# Patient Record
Sex: MALE | Race: WHITE | Employment: UNEMPLOYED | URBAN - METROPOLITAN AREA
[De-identification: names, ages, dates, MRNs, and addresses within clinical notes are randomized per-mention and may not be internally consistent; named-entity substitution may affect disease eponyms.]

---

## 2017-01-12 ENCOUNTER — OP HISTORICAL/CONVERTED ENCOUNTER (OUTPATIENT)
Dept: OTHER | Age: 47
End: 2017-01-12

## 2017-03-21 ENCOUNTER — IP HISTORICAL/CONVERTED ENCOUNTER (OUTPATIENT)
Dept: OTHER | Age: 47
End: 2017-03-21

## 2017-05-19 ENCOUNTER — ED HISTORICAL/CONVERTED ENCOUNTER (OUTPATIENT)
Dept: OTHER | Age: 47
End: 2017-05-19

## 2017-06-27 ENCOUNTER — ED HISTORICAL/CONVERTED ENCOUNTER (OUTPATIENT)
Dept: OTHER | Age: 47
End: 2017-06-27

## 2017-10-19 ENCOUNTER — IP HISTORICAL/CONVERTED ENCOUNTER (OUTPATIENT)
Dept: OTHER | Age: 47
End: 2017-10-19

## 2017-12-15 ENCOUNTER — ED HISTORICAL/CONVERTED ENCOUNTER (OUTPATIENT)
Dept: OTHER | Age: 47
End: 2017-12-15

## 2018-03-08 ENCOUNTER — ED HISTORICAL/CONVERTED ENCOUNTER (OUTPATIENT)
Dept: OTHER | Age: 48
End: 2018-03-08

## 2022-06-30 ENCOUNTER — HOSPITAL ENCOUNTER (EMERGENCY)
Age: 52
Discharge: ANOTHER HOSPITAL | End: 2022-06-30
Attending: EMERGENCY MEDICINE
Payer: COMMERCIAL

## 2022-06-30 ENCOUNTER — APPOINTMENT (OUTPATIENT)
Dept: GENERAL RADIOLOGY | Age: 52
End: 2022-06-30
Attending: EMERGENCY MEDICINE
Payer: COMMERCIAL

## 2022-06-30 ENCOUNTER — HOSPITAL ENCOUNTER (INPATIENT)
Age: 52
LOS: 1 days | Discharge: HOME | DRG: 305 | End: 2022-07-03
Attending: EMERGENCY MEDICINE | Admitting: HOSPITALIST
Payer: MEDICAID

## 2022-06-30 VITALS
SYSTOLIC BLOOD PRESSURE: 136 MMHG | OXYGEN SATURATION: 99 % | RESPIRATION RATE: 18 BRPM | DIASTOLIC BLOOD PRESSURE: 112 MMHG | HEART RATE: 66 BPM | TEMPERATURE: 98.9 F

## 2022-06-30 DIAGNOSIS — I10 HYPERTENSION, UNSPECIFIED TYPE: Primary | ICD-10-CM

## 2022-06-30 DIAGNOSIS — R07.9 ACUTE CHEST PAIN: Primary | ICD-10-CM

## 2022-06-30 DIAGNOSIS — I10 PRIMARY HYPERTENSION: ICD-10-CM

## 2022-06-30 PROBLEM — I16.1 HYPERTENSIVE EMERGENCY: Status: ACTIVE | Noted: 2022-06-30

## 2022-06-30 LAB
ALBUMIN SERPL-MCNC: 3.9 G/DL (ref 3.5–5)
ALBUMIN/GLOB SERPL: 1.1 {RATIO} (ref 1.1–2.2)
ALP SERPL-CCNC: 69 U/L (ref 45–117)
ALT SERPL-CCNC: 22 U/L (ref 12–78)
AMPHET UR QL SCN: NEGATIVE
ANION GAP SERPL CALC-SCNC: 6 MMOL/L (ref 5–15)
APPEARANCE UR: CLEAR
AST SERPL W P-5'-P-CCNC: 15 U/L (ref 15–37)
BACTERIA URNS QL MICRO: NEGATIVE /HPF
BARBITURATES UR QL SCN: NEGATIVE
BASOPHILS # BLD: 0 K/UL (ref 0–0.2)
BASOPHILS NFR BLD: 0 % (ref 0–2.5)
BENZODIAZ UR QL: NEGATIVE
BILIRUB SERPL-MCNC: 0.7 MG/DL (ref 0.2–1)
BILIRUB UR QL: NEGATIVE
BNP SERPL-MCNC: 49 PG/ML
BUN SERPL-MCNC: 9 MG/DL (ref 6–20)
BUN/CREAT SERPL: 8 (ref 12–20)
CA-I BLD-MCNC: 9.1 MG/DL (ref 8.5–10.1)
CANNABINOIDS UR QL SCN: NEGATIVE
CHLORIDE SERPL-SCNC: 104 MMOL/L (ref 97–108)
CO2 SERPL-SCNC: 30 MMOL/L (ref 21–32)
COCAINE UR QL SCN: NEGATIVE
COLOR UR: ABNORMAL
CREAT SERPL-MCNC: 1.15 MG/DL (ref 0.7–1.3)
DRUG SCRN COMMENT,DRGCM: NORMAL
ECSTASY, ECST: NEGATIVE
EOSINOPHIL # BLD: 0.1 K/UL (ref 0–0.7)
EOSINOPHIL NFR BLD: 1 % (ref 0.9–2.9)
ERYTHROCYTE [DISTWIDTH] IN BLOOD BY AUTOMATED COUNT: 13.5 % (ref 11.5–14.5)
GLOBULIN SER CALC-MCNC: 3.7 G/DL (ref 2–4)
GLUCOSE SERPL-MCNC: 86 MG/DL (ref 65–100)
GLUCOSE UR STRIP.AUTO-MCNC: NEGATIVE MG/DL
HCT VFR BLD AUTO: 43.5 % (ref 41–53)
HGB BLD-MCNC: 13.8 G/DL (ref 13.5–17.5)
HGB UR QL STRIP: ABNORMAL
INR PPP: 1 (ref 0.9–1.1)
KETONES UR QL STRIP.AUTO: NEGATIVE MG/DL
LEUKOCYTE ESTERASE UR QL STRIP.AUTO: NEGATIVE
LYMPHOCYTES # BLD: 2.2 K/UL (ref 1–4.8)
LYMPHOCYTES NFR BLD: 35 % (ref 20.5–51.1)
MAGNESIUM SERPL-MCNC: 2.2 MG/DL (ref 1.6–2.4)
MCH RBC QN AUTO: 26.3 PG (ref 31–34)
MCHC RBC AUTO-ENTMCNC: 31.7 G/DL (ref 31–36)
MCV RBC AUTO: 82.8 FL (ref 80–100)
METHADONE UR QL: NEGATIVE
MONOCYTES # BLD: 0.7 K/UL (ref 0.2–2.4)
MONOCYTES NFR BLD: 10 % (ref 1.7–9.3)
NEUTS SEG # BLD: 3.4 K/UL (ref 1.8–7.7)
NEUTS SEG NFR BLD: 54 % (ref 42–75)
NITRITE UR QL STRIP.AUTO: NEGATIVE
NRBC # BLD: 0.01 K/UL
NRBC BLD-RTO: 0.1 PER 100 WBC
OPIATES UR QL: NEGATIVE
PCP UR QL: NEGATIVE
PH UR STRIP: 6.5 [PH] (ref 5–8)
PLATELET # BLD AUTO: 224 K/UL (ref 150–400)
PMV BLD AUTO: 8 FL (ref 6.5–11.5)
POTASSIUM SERPL-SCNC: 3.8 MMOL/L (ref 3.5–5.1)
PROT SERPL-MCNC: 7.6 G/DL (ref 6.4–8.2)
PROT UR STRIP-MCNC: NEGATIVE MG/DL
PROTHROMBIN TIME: 13.2 SEC (ref 11.9–14.6)
RBC # BLD AUTO: 5.25 M/UL (ref 4.5–5.9)
RBC #/AREA URNS HPF: NORMAL /HPF (ref 0–3)
SODIUM SERPL-SCNC: 140 MMOL/L (ref 136–145)
SP GR UR REFRACTOMETRY: 1.02 (ref 1–1.03)
TROPONIN-HIGH SENSITIVITY: 14 NG/L (ref 0–76)
TROPONIN-HIGH SENSITIVITY: 15 NG/L (ref 0–76)
UROBILINOGEN UR QL STRIP.AUTO: 0.2 EU/DL (ref 0.2–1)
WBC # BLD AUTO: 6.4 K/UL (ref 4.4–11.3)
WBC URNS QL MICRO: NORMAL /HPF (ref 0–5)

## 2022-06-30 PROCEDURE — 74011250637 HC RX REV CODE- 250/637: Performed by: EMERGENCY MEDICINE

## 2022-06-30 PROCEDURE — 84484 ASSAY OF TROPONIN QUANT: CPT

## 2022-06-30 PROCEDURE — 99285 EMERGENCY DEPT VISIT HI MDM: CPT

## 2022-06-30 PROCEDURE — 93005 ELECTROCARDIOGRAM TRACING: CPT

## 2022-06-30 PROCEDURE — 81001 URINALYSIS AUTO W/SCOPE: CPT

## 2022-06-30 PROCEDURE — 80053 COMPREHEN METABOLIC PANEL: CPT

## 2022-06-30 PROCEDURE — 96374 THER/PROPH/DIAG INJ IV PUSH: CPT

## 2022-06-30 PROCEDURE — 36415 COLL VENOUS BLD VENIPUNCTURE: CPT

## 2022-06-30 PROCEDURE — 80307 DRUG TEST PRSMV CHEM ANLYZR: CPT

## 2022-06-30 PROCEDURE — 96375 TX/PRO/DX INJ NEW DRUG ADDON: CPT

## 2022-06-30 PROCEDURE — 83880 ASSAY OF NATRIURETIC PEPTIDE: CPT

## 2022-06-30 PROCEDURE — 71045 X-RAY EXAM CHEST 1 VIEW: CPT

## 2022-06-30 PROCEDURE — 85025 COMPLETE CBC W/AUTO DIFF WBC: CPT

## 2022-06-30 PROCEDURE — 74011250636 HC RX REV CODE- 250/636: Performed by: EMERGENCY MEDICINE

## 2022-06-30 PROCEDURE — C9113 INJ PANTOPRAZOLE SODIUM, VIA: HCPCS | Performed by: EMERGENCY MEDICINE

## 2022-06-30 PROCEDURE — 74011000250 HC RX REV CODE- 250: Performed by: EMERGENCY MEDICINE

## 2022-06-30 PROCEDURE — 96372 THER/PROPH/DIAG INJ SC/IM: CPT

## 2022-06-30 PROCEDURE — 83735 ASSAY OF MAGNESIUM: CPT

## 2022-06-30 PROCEDURE — 85610 PROTHROMBIN TIME: CPT

## 2022-06-30 RX ORDER — MAG HYDROX/ALUMINUM HYD/SIMETH 200-200-20
30 SUSPENSION, ORAL (FINAL DOSE FORM) ORAL
Status: COMPLETED | OUTPATIENT
Start: 2022-06-30 | End: 2022-06-30

## 2022-06-30 RX ORDER — GUAIFENESIN 100 MG/5ML
324 LIQUID (ML) ORAL
Status: COMPLETED | OUTPATIENT
Start: 2022-06-30 | End: 2022-06-30

## 2022-06-30 RX ORDER — NIFEDIPINE 30 MG/1
60 TABLET, EXTENDED RELEASE ORAL
Status: COMPLETED | OUTPATIENT
Start: 2022-06-30 | End: 2022-06-30

## 2022-06-30 RX ORDER — MORPHINE SULFATE 4 MG/ML
8 INJECTION INTRAVENOUS ONCE
Status: COMPLETED | OUTPATIENT
Start: 2022-06-30 | End: 2022-06-30

## 2022-06-30 RX ORDER — NITROGLYCERIN 0.4 MG/1
0.4 TABLET SUBLINGUAL
Status: COMPLETED | OUTPATIENT
Start: 2022-06-30 | End: 2022-06-30

## 2022-06-30 RX ORDER — ACETAMINOPHEN 500 MG
1000 TABLET ORAL ONCE
Status: COMPLETED | OUTPATIENT
Start: 2022-06-30 | End: 2022-06-30

## 2022-06-30 RX ORDER — NITROGLYCERIN 20 MG/100ML
0-20 INJECTION INTRAVENOUS
Status: DISCONTINUED | OUTPATIENT
Start: 2022-06-30 | End: 2022-06-30 | Stop reason: HOSPADM

## 2022-06-30 RX ADMIN — NITROGLYCERIN 5 MCG/MIN: 20 INJECTION INTRAVENOUS at 18:05

## 2022-06-30 RX ADMIN — MORPHINE SULFATE 8 MG: 4 INJECTION, SOLUTION INTRAMUSCULAR; INTRAVENOUS at 14:25

## 2022-06-30 RX ADMIN — ALUMINA, MAGNESIA, AND SIMETHICONE ORAL SUSPENSION REGULAR STRENGTH 30 ML: 1200; 1200; 120 SUSPENSION ORAL at 15:54

## 2022-06-30 RX ADMIN — NITROGLYCERIN 0.4 MG: 0.4 TABLET, ORALLY DISINTEGRATING SUBLINGUAL at 12:43

## 2022-06-30 RX ADMIN — ACETAMINOPHEN 1000 MG: 500 TABLET ORAL at 22:15

## 2022-06-30 RX ADMIN — NIFEDIPINE 60 MG: 30 TABLET, EXTENDED RELEASE ORAL at 15:53

## 2022-06-30 RX ADMIN — ASPIRIN 81 MG 324 MG: 81 TABLET ORAL at 12:44

## 2022-06-30 RX ADMIN — SODIUM CHLORIDE 40 MG: 9 INJECTION, SOLUTION INTRAMUSCULAR; INTRAVENOUS; SUBCUTANEOUS at 15:55

## 2022-06-30 RX ADMIN — NITROGLYCERIN 1 INCH: 20 OINTMENT TOPICAL at 22:15

## 2022-06-30 RX ADMIN — NITROGLYCERIN 0.4 MG: 0.4 TABLET, ORALLY DISINTEGRATING SUBLINGUAL at 14:24

## 2022-06-30 NOTE — ED PROVIDER NOTES
EMERGENCY DEPARTMENT HISTORY AND PHYSICAL EXAM      Date: 6/30/2022  Patient Name: Cr Graham      History of Presenting Illness     Chief Complaint   Patient presents with    Chest Pain       History Provided By: Patient    HPI: Cr Graham, 50 y.o. male with a past medical history significant hypertension, myocardial infarction and CAD, defibrillator, CVA presents to the ED with cc of chest pain sharp nonradiating patient received 3 sublingual nitro with no relief of pain patient had associated shortness of breath no [facility], patient describes the pain intensity as 7/10 but it was bad as 10/10 pain started 0600 hours, patient states his defibrillator did not fire off today    There are no other complaints, changes, or physical findings at this time. PCP: Unknown, Provider, PA    Current Outpatient Medications   Medication Sig Dispense Refill    amLODIPine (NORVASC) 5 mg tablet Take 1 Tab by mouth daily. 30 Tab 0    aspirin 81 mg chewable tablet Take 81 mg by mouth daily.  cloNIDine HCl (CATAPRES) 0.2 mg tablet Take  by mouth two (2) times a day.  simvastatin (ZOCOR) 20 mg tablet Take  by mouth nightly.  cyanocobalamin (VITAMIN B12) 100 mcg tablet Take 50 mcg by mouth daily.  levETIRAcetam (KEPPRA) 750 mg tablet Take 750 mg by mouth two (2) times a day.  omeprazole (PRILOSEC) 40 mg capsule Take 40 mg by mouth daily.  tamsulosin (FLOMAX) 0.4 mg capsule Take 0.4 mg by mouth daily.  nitroglycerin (NITROSTAT) 0.4 mg SL tablet by SubLINGual route every five (5) minutes as needed for Chest Pain. Past History   Past Medical History:  Past Medical History:   Diagnosis Date    [facility] [address]     Dyslipidemia     Hypertension     MI (myocardial infarction) [facility] [address]     Seizures ([facility] [address]        Past Surgical History:  No past surgical history on file. Family History:  No family history on file.     Social History:  Social History     Tobacco Use    Smoking status: Unknown If Ever Smoked    Smokeless tobacco: Not on file   Substance Use Topics    Alcohol use: No    Drug use: Not on file       Allergies: Allergies   Allergen Reactions    Ciprofloxacin Unknown (comments)    Clindamycin Unknown (comments)    Doxycycline Unknown (comments)    Lisinopril Unknown (comments)    Sulfa (Sulfonamide Antibiotics) Unknown (comments)     Review of Systems   Review of Systems   Constitutional: Negative for chills and fever. HENT: Negative for rhinorrhea and sore throat. Eyes: Negative for pain and visual disturbance. Respiratory: Positive for shortness of breath. Negative for cough. Cardiovascular: Positive for chest pain. Negative for leg swelling. Gastrointestinal: Negative for abdominal pain and vomiting. Endocrine: Negative for polydipsia and polyuria. Genitourinary: Negative for dysuria and hematuria. Musculoskeletal: Negative for back pain and neck pain. Skin: Negative for color change and pallor. Neurological: Negative for weakness and headaches. Psychiatric/Behavioral: Negative for agitation and suicidal ideas. Physical Exam   Physical Exam  Vitals and nursing note reviewed. Constitutional:       General: He is not in acute distress. Appearance: He is not ill-appearing, toxic-appearing or diaphoretic. HENT:      Head: Normocephalic and atraumatic. Right Ear: Tympanic membrane normal.      Left Ear: Tympanic membrane normal.      Nose: Nose normal. No congestion. Mouth/Throat:      Mouth: Mucous membranes are moist.      Pharynx: Oropharynx is clear. Eyes:      Extraocular Movements: Extraocular movements intact. Conjunctiva/sclera: Conjunctivae normal.      Pupils: Pupils are equal, round, and reactive to light. Cardiovascular:      Rate and Rhythm: Normal rate and regular rhythm. Pulses: Normal pulses. Heart sounds: Normal heart sounds.    Pulmonary:      Effort: Pulmonary effort is normal.      Breath sounds: Normal breath sounds. Abdominal:      General: Bowel sounds are normal.      Palpations: Abdomen is soft. Tenderness: There is no abdominal tenderness. Musculoskeletal:         General: No tenderness, deformity or signs of injury. Normal range of motion. Cervical back: Normal range of motion and neck supple. No rigidity or tenderness. Right lower leg: No tenderness. No edema. Left lower leg: No tenderness. No edema. Lymphadenopathy:      Cervical: No cervical adenopathy. Skin:     General: Skin is warm and dry. Capillary Refill: Capillary refill takes less than 2 seconds. Findings: No rash. Neurological:      General: No focal deficit present. Mental Status: He is alert and oriented to person, place, and time. Cranial Nerves: No cranial nerve deficit. Sensory: No sensory deficit. Psychiatric:         Mood and Affect: Mood normal.         Behavior: Behavior normal.         Lab and Diagnostic Study Results   Labs -   No results found for this or any previous visit (from the past 12 hour(s)). Radiologic Studies -   [unfilled]  CT Results  (Last 48 hours)    None        CXR Results  (Last 48 hours)    None          Medical Decision Making and ED Course   - I am the first and primary provider for this patient AND AM THE PRIMARY PROVIDER OF RECORD. I reviewed the vital signs, available nursing notes, past medical history, past surgical history, family history and social history. - Initial assessment performed. The patients presenting problems have been discussed, and the staff are in agreement with the care plan formulated and outlined with them. I have encouraged them to ask questions as they arise throughout their visit.     Differential Diagnosis & Medical Decision Making Provider Note:   Complaint of chest pain differential diagnosis of ACS, CHF, pneumonia  MDM     Vital Signs-Reviewed the patient's vital signs.  No data found. EKG interpretation: (Preliminary): Performed at [phone]. EKG Interpreted by me. Shows atrial paced rhythm rate 62 diffusely flattened T waves no acute ischemic changes    ED Course:   ED Course as of 06/30/22 1825   Thu Jun 30, 2022   1337 Patient's chest pain down to 5 [SB]   1516 Patient complaining of headache and burning sensation to central chest [SB]   1817 Pain diminished down to 3/10 after initiation of nitroglycerin infusion [SB]      ED Course User Index  [SB] Nilsa Crisostomo MD       HYPERTENSION COUNSELING: Education was provided to the patient today regarding their hypertension. Patient is made aware of their elevated blood pressure and is instructed to follow up this week with their Primary Care for a recheck. Patient is counseled regarding consequences of chronic, uncontrolled hypertension including kidney disease, heart disease, stroke or even death. Patient states their understanding and agrees to follow up this week. Additionally, during their visit, I discussed sodium restriction, maintaining ideal body weight and regular exercise program as physiologic means to achieve blood pressure control. The patient will strive towards this. Procedures and Critical Care     Performed by: Ronda Coughlin MD  Procedures      CRITICAL CARE NOTE :  12:28 PM  Amount of Critical Care Time: 42minutes    IMPENDING DETERIORATION -Cardiovascular  ASSOCIATED RISK FACTORS - Dysrhythmia and Myocardial infarction, CVA  MANAGEMENT- Bedside Assessment and Transfer  INTERPRETATION -  Blood Pressure  INTERVENTIONS - hemodynamic mngmt  CASE REVIEW -none  TREATMENT RESPONSE -Improved  PERFORMED BY - Self    NOTES   :  I have spent the above critical care time involved in lab review, consultations with specialist, family decision- making, bedside attention and documentation. This time excludes time spent in any separate billed procedures.   During this entire length of time I was immediately available to the patient . Maia Jung MD    Disposition   Disposition: Condition stable, improved and ongoing  Transferred to [facility] patient verbally agreed to transfer and understand the risks involved as outlined in the EMTALA form. DISCHARGE PLAN:  1. Current Discharge Medication List      CONTINUE these medications which have NOT CHANGED    Details   amLODIPine (NORVASC) 5 mg tablet Take 1 Tab by mouth daily. Qty: 30 Tab, Refills: 0      aspirin 81 mg chewable tablet Take 81 mg by mouth daily. cloNIDine HCl (CATAPRES) 0.2 mg tablet Take  by mouth two (2) times a day. simvastatin (ZOCOR) 20 mg tablet Take  by mouth nightly. cyanocobalamin (VITAMIN B12) 100 mcg tablet Take 50 mcg by mouth daily. levETIRAcetam (KEPPRA) 750 mg tablet Take 750 mg by mouth two (2) times a day. omeprazole (PRILOSEC) 40 mg capsule Take 40 mg by mouth daily. tamsulosin (FLOMAX) 0.4 mg capsule Take 0.4 mg by mouth daily. nitroglycerin (NITROSTAT) 0.4 mg SL tablet by SubLINGual route every five (5) minutes as needed for Chest Pain. 2.   Follow-up Information    None       3. Return to ED if worse   4. Current Discharge Medication List        Remove if not discharged    Diagnosis/Clinical Impression     Clinical Impression: No diagnosis found. Attestations:  Maia Jung MD    Please note that this dictation was completed with [employer], the computer voice recognition software. Quite often unanticipated grammatical, syntax, homophones, and other interpretive errors are inadvertently transcribed by the computer software. Please disregard these errors. Please excuse any errors that have escaped final proofreading. Thank you.

## 2022-06-30 NOTE — ED TRIAGE NOTES
Patient reports substernal chest pain/pressure that woke him out of his sleep at around 0630. Was given nitro x3 a [facility] with no relief.

## 2022-06-30 NOTE — ED NOTES
Pt nitro drip titrated up to 10mcg, Pt bp still over target range, Oncoming RN ED made aware.  Awaiting Transport to [facility] ED

## 2022-06-30 NOTE — Clinical Note
Patient Class[de-identified] OBSERVATION [104]   Type of Bed: Remote Telemetry [29]   Cardiac Monitoring Required?: Yes   Reason for Observation: chest pain   Admitting Diagnosis: Chest pain [086949]   Admitting Diagnosis: Hypertensive emergency [8111288]   Admitting Physician: [facility] [[id number]   Attending Physician: [facility] [[id number]]

## 2022-07-01 ENCOUNTER — APPOINTMENT (OUTPATIENT)
Dept: NON INVASIVE DIAGNOSTICS | Age: 52
DRG: 305 | End: 2022-07-01
Attending: INTERNAL MEDICINE
Payer: MEDICAID

## 2022-07-01 LAB
ATRIAL RATE: 62 BPM
ATRIAL RATE: 64 BPM
CALCULATED P AXIS, ECG09: 19 DEGREES
CALCULATED P AXIS, ECG09: 44 DEGREES
CALCULATED R AXIS, ECG10: -4 DEGREES
CALCULATED R AXIS, ECG10: 9 DEGREES
CALCULATED T AXIS, ECG11: -11 DEGREES
CALCULATED T AXIS, ECG11: 6 DEGREES
DIAGNOSIS, 93000: NORMAL
DIAGNOSIS, 93000: NORMAL
ECHO AO ASC DIAM: 3.9 CM
ECHO AO ASCENDING AORTA INDEX: 1.83 CM/M2
ECHO AO DESC DIAM: 2.1 CM
ECHO AO DESCENDING AORTA INDEX: 0.99 CM/M2
ECHO AO ROOT DIAM: 4.4 CM
ECHO AO ROOT INDEX: 2.07 CM/M2
ECHO AV AREA PEAK VELOCITY: 2.8 CM2
ECHO AV AREA/BSA PEAK VELOCITY: 1.3 CM2/M2
ECHO AV PEAK GRADIENT: 8 MMHG
ECHO AV PEAK VELOCITY: 1.4 M/S
ECHO AV VELOCITY RATIO: 0.79
ECHO EST RA PRESSURE: 3 MMHG
ECHO LA AREA 4C: 11.7 CM2
ECHO LA DIAMETER INDEX: 1.36 CM/M2
ECHO LA DIAMETER: 2.9 CM
ECHO LA MAJOR AXIS: 4.7 CM
ECHO LA TO AORTIC ROOT RATIO: 0.66
ECHO LV E' LATERAL VELOCITY: 8 CM/S
ECHO LV E' SEPTAL VELOCITY: 5 CM/S
ECHO LV EJECTION FRACTION BIPLANE: 63 % (ref 55–100)
ECHO LV FRACTIONAL SHORTENING: 35 % (ref 28–44)
ECHO LV INTERNAL DIMENSION DIASTOLE INDEX: 2.16 CM/M2
ECHO LV INTERNAL DIMENSION DIASTOLIC: 4.6 CM (ref 4.2–5.9)
ECHO LV INTERNAL DIMENSION SYSTOLIC INDEX: 1.41 CM/M2
ECHO LV INTERNAL DIMENSION SYSTOLIC: 3 CM
ECHO LV IVSD: 1.2 CM (ref 0.6–1)
ECHO LV MASS 2D: 192.9 G (ref 88–224)
ECHO LV MASS INDEX 2D: 90.6 G/M2 (ref 49–115)
ECHO LV POSTERIOR WALL DIASTOLIC: 1.1 CM (ref 0.6–1)
ECHO LV RELATIVE WALL THICKNESS RATIO: 0.48
ECHO LVOT AREA: 3.5 CM2
ECHO LVOT DIAM: 2.1 CM
ECHO LVOT PEAK GRADIENT: 5 MMHG
ECHO LVOT PEAK VELOCITY: 1.1 M/S
ECHO MV A VELOCITY: 0.91 M/S
ECHO MV E DECELERATION TIME (DT): 243 MS
ECHO MV E VELOCITY: 0.62 M/S
ECHO MV E/A RATIO: 0.68
ECHO MV E/E' LATERAL: 7.75
ECHO MV E/E' RATIO (AVERAGED): 10.08
ECHO MV E/E' SEPTAL: 12.4
ECHO MV MAX VELOCITY: 1 M/S
ECHO MV MEAN GRADIENT: 2 MMHG
ECHO MV MEAN VELOCITY: 0.7 M/S
ECHO MV PEAK GRADIENT: 4 MMHG
ECHO MV REGURGITANT PEAK GRADIENT: 8 MMHG
ECHO MV REGURGITANT PEAK VELOCITY: 1.4 M/S
ECHO MV VTI: 23.3 CM
ECHO PVEIN A DURATION: 120 MS
ECHO PVEIN A VELOCITY: 0.3 M/S
ECHO RIGHT VENTRICULAR SYSTOLIC PRESSURE (RVSP): 16 MMHG
ECHO RV TAPSE: 1.7 CM (ref 1.7–?)
ECHO TV REGURGITANT MAX VELOCITY: 1.78 M/S
ECHO TV REGURGITANT PEAK GRADIENT: 13 MMHG
MRSA DNA SPEC QL NAA+PROBE: NOT DETECTED
P-R INTERVAL, ECG05: 163 MS
P-R INTERVAL, ECG05: 236 MS
Q-T INTERVAL, ECG07: 393 MS
Q-T INTERVAL, ECG07: 396 MS
QRS DURATION, ECG06: 76 MS
QRS DURATION, ECG06: 82 MS
QTC CALCULATION (BEZET), ECG08: 399 MS
QTC CALCULATION (BEZET), ECG08: 408 MS
TROPONIN-HIGH SENSITIVITY: 7 NG/L (ref 0–76)
VENTRICULAR RATE, ECG03: 62 BPM
VENTRICULAR RATE, ECG03: 64 BPM

## 2022-07-01 PROCEDURE — G0378 HOSPITAL OBSERVATION PER HR: HCPCS

## 2022-07-01 PROCEDURE — 74011000250 HC RX REV CODE- 250: Performed by: INTERNAL MEDICINE

## 2022-07-01 PROCEDURE — 74011250637 HC RX REV CODE- 250/637: Performed by: INTERNAL MEDICINE

## 2022-07-01 PROCEDURE — 74011250637 HC RX REV CODE- 250/637: Performed by: HOSPITALIST

## 2022-07-01 PROCEDURE — 87641 MR-STAPH DNA AMP PROBE: CPT

## 2022-07-01 PROCEDURE — 93306 TTE W/DOPPLER COMPLETE: CPT

## 2022-07-01 PROCEDURE — 96376 TX/PRO/DX INJ SAME DRUG ADON: CPT

## 2022-07-01 PROCEDURE — 74011250636 HC RX REV CODE- 250/636: Performed by: INTERNAL MEDICINE

## 2022-07-01 RX ORDER — BISMUTH SUBSALICYLATE 262 MG
1 TABLET,CHEWABLE ORAL DAILY
COMMUNITY

## 2022-07-01 RX ORDER — LATANOPROST 50 UG/ML
1 SOLUTION/ DROPS OPHTHALMIC
COMMUNITY
Start: 2022-04-21 | End: 2022-07-19

## 2022-07-01 RX ORDER — TAMSULOSIN HYDROCHLORIDE 0.4 MG/1
0.4 CAPSULE ORAL DAILY
Status: DISCONTINUED | OUTPATIENT
Start: 2022-07-01 | End: 2022-07-03 | Stop reason: HOSPADM

## 2022-07-01 RX ORDER — NIFEDIPINE 90 MG/1
90 TABLET, EXTENDED RELEASE ORAL DAILY
COMMUNITY
End: 2022-07-03

## 2022-07-01 RX ORDER — CLOPIDOGREL BISULFATE 75 MG/1
75 TABLET ORAL DAILY
COMMUNITY

## 2022-07-01 RX ORDER — ALBUTEROL SULFATE 90 UG/1
2 AEROSOL, METERED RESPIRATORY (INHALATION)
COMMUNITY
Start: 2022-04-12 | End: 2022-10-08

## 2022-07-01 RX ORDER — BUSPIRONE HYDROCHLORIDE 30 MG/1
30 TABLET ORAL 2 TIMES DAILY
COMMUNITY

## 2022-07-01 RX ORDER — SODIUM CHLORIDE 0.9 % (FLUSH) 0.9 %
5-40 SYRINGE (ML) INJECTION EVERY 8 HOURS
Status: DISCONTINUED | OUTPATIENT
Start: 2022-07-01 | End: 2022-07-03 | Stop reason: HOSPADM

## 2022-07-01 RX ORDER — AMLODIPINE BESYLATE 5 MG/1
10 TABLET ORAL DAILY
Status: DISCONTINUED | OUTPATIENT
Start: 2022-07-01 | End: 2022-07-03 | Stop reason: HOSPADM

## 2022-07-01 RX ORDER — HYDROMORPHONE HYDROCHLORIDE 1 MG/ML
0.5 INJECTION, SOLUTION INTRAMUSCULAR; INTRAVENOUS; SUBCUTANEOUS
Status: ACTIVE | OUTPATIENT
Start: 2022-07-01 | End: 2022-07-03

## 2022-07-01 RX ORDER — CARVEDILOL 3.12 MG/1
6.25 TABLET ORAL 2 TIMES DAILY WITH MEALS
Status: DISCONTINUED | OUTPATIENT
Start: 2022-07-01 | End: 2022-07-03 | Stop reason: HOSPADM

## 2022-07-01 RX ORDER — ONDANSETRON 4 MG/1
4 TABLET, ORALLY DISINTEGRATING ORAL
Status: DISCONTINUED | OUTPATIENT
Start: 2022-07-01 | End: 2022-07-03 | Stop reason: HOSPADM

## 2022-07-01 RX ORDER — ATORVASTATIN CALCIUM 10 MG/1
10 TABLET, FILM COATED ORAL
Status: DISCONTINUED | OUTPATIENT
Start: 2022-07-01 | End: 2022-07-03 | Stop reason: HOSPADM

## 2022-07-01 RX ORDER — CLONIDINE HYDROCHLORIDE 0.1 MG/1
0.2 TABLET ORAL 2 TIMES DAILY
Status: DISCONTINUED | OUTPATIENT
Start: 2022-07-01 | End: 2022-07-03

## 2022-07-01 RX ORDER — FLUTICASONE PROPIONATE AND SALMETEROL 232; 14 UG/1; UG/1
1 POWDER, METERED RESPIRATORY (INHALATION) 2 TIMES DAILY
COMMUNITY

## 2022-07-01 RX ORDER — PANTOPRAZOLE SODIUM 40 MG/1
40 TABLET, DELAYED RELEASE ORAL
Status: DISCONTINUED | OUTPATIENT
Start: 2022-07-01 | End: 2022-07-03 | Stop reason: HOSPADM

## 2022-07-01 RX ORDER — SALINE NASAL SPRAY 1.5 OZ
1 SOLUTION NASAL 4 TIMES DAILY
COMMUNITY

## 2022-07-01 RX ORDER — AMLODIPINE BESYLATE 5 MG/1
5 TABLET ORAL DAILY
Status: DISCONTINUED | OUTPATIENT
Start: 2022-07-01 | End: 2022-07-01

## 2022-07-01 RX ORDER — GUAIFENESIN 100 MG/5ML
81 LIQUID (ML) ORAL DAILY
Status: DISCONTINUED | OUTPATIENT
Start: 2022-07-01 | End: 2022-07-03 | Stop reason: HOSPADM

## 2022-07-01 RX ORDER — CARVEDILOL 6.25 MG/1
6.25 TABLET ORAL 2 TIMES DAILY
COMMUNITY

## 2022-07-01 RX ORDER — ALBUTEROL SULFATE 0.83 MG/ML
2.5 SOLUTION RESPIRATORY (INHALATION)
COMMUNITY
Start: 2022-04-21 | End: 2022-07-19

## 2022-07-01 RX ORDER — ENOXAPARIN SODIUM 100 MG/ML
40 INJECTION SUBCUTANEOUS DAILY
Status: DISCONTINUED | OUTPATIENT
Start: 2022-07-01 | End: 2022-07-03 | Stop reason: HOSPADM

## 2022-07-01 RX ORDER — ACETAMINOPHEN 650 MG/1
650 SUPPOSITORY RECTAL
Status: DISCONTINUED | OUTPATIENT
Start: 2022-07-01 | End: 2022-07-03 | Stop reason: HOSPADM

## 2022-07-01 RX ORDER — ACETAMINOPHEN 325 MG/1
650 TABLET ORAL
Status: DISCONTINUED | OUTPATIENT
Start: 2022-07-01 | End: 2022-07-03 | Stop reason: HOSPADM

## 2022-07-01 RX ORDER — ATORVASTATIN CALCIUM 40 MG/1
40 TABLET, FILM COATED ORAL DAILY
COMMUNITY

## 2022-07-01 RX ORDER — POLYETHYLENE GLYCOL 3350 17 G/17G
17 POWDER, FOR SOLUTION ORAL DAILY PRN
Status: DISCONTINUED | OUTPATIENT
Start: 2022-07-01 | End: 2022-07-03 | Stop reason: HOSPADM

## 2022-07-01 RX ORDER — LOSARTAN POTASSIUM 50 MG/1
50 TABLET ORAL DAILY
Status: DISCONTINUED | OUTPATIENT
Start: 2022-07-01 | End: 2022-07-03

## 2022-07-01 RX ORDER — SODIUM CHLORIDE 0.9 % (FLUSH) 0.9 %
5-40 SYRINGE (ML) INJECTION AS NEEDED
Status: DISCONTINUED | OUTPATIENT
Start: 2022-07-01 | End: 2022-07-03 | Stop reason: HOSPADM

## 2022-07-01 RX ORDER — BUTALBITAL, ACETAMINOPHEN AND CAFFEINE 50; 325; 40 MG/1; MG/1; MG/1
1 TABLET ORAL
Status: DISCONTINUED | OUTPATIENT
Start: 2022-07-01 | End: 2022-07-03 | Stop reason: HOSPADM

## 2022-07-01 RX ORDER — ONDANSETRON 2 MG/ML
4 INJECTION INTRAMUSCULAR; INTRAVENOUS
Status: DISCONTINUED | OUTPATIENT
Start: 2022-07-01 | End: 2022-07-03 | Stop reason: HOSPADM

## 2022-07-01 RX ADMIN — ATORVASTATIN CALCIUM 10 MG: 10 TABLET, FILM COATED ORAL at 22:09

## 2022-07-01 RX ADMIN — BUTALBITAL, ACETAMINOPHEN, AND CAFFEINE 1 TABLET: 50; 325; 40 TABLET ORAL at 17:35

## 2022-07-01 RX ADMIN — CLONIDINE HYDROCHLORIDE 0.2 MG: 0.1 TABLET ORAL at 20:35

## 2022-07-01 RX ADMIN — ENOXAPARIN SODIUM 40 MG: 100 INJECTION SUBCUTANEOUS at 09:04

## 2022-07-01 RX ADMIN — CARVEDILOL 6.25 MG: 3.12 TABLET, FILM COATED ORAL at 16:55

## 2022-07-01 RX ADMIN — CLONIDINE HYDROCHLORIDE 0.2 MG: 0.1 TABLET ORAL at 08:52

## 2022-07-01 RX ADMIN — ONDANSETRON 4 MG: 2 INJECTION INTRAMUSCULAR; INTRAVENOUS at 01:03

## 2022-07-01 RX ADMIN — SODIUM CHLORIDE, PRESERVATIVE FREE 10 ML: 5 INJECTION INTRAVENOUS at 06:00

## 2022-07-01 RX ADMIN — ACETAMINOPHEN 650 MG: 325 TABLET ORAL at 04:20

## 2022-07-01 RX ADMIN — ATORVASTATIN CALCIUM 10 MG: 10 TABLET, FILM COATED ORAL at 01:04

## 2022-07-01 RX ADMIN — AMLODIPINE BESYLATE 10 MG: 5 TABLET ORAL at 08:52

## 2022-07-01 RX ADMIN — SODIUM CHLORIDE 10 MG/HR: 9 INJECTION, SOLUTION INTRAVENOUS at 13:51

## 2022-07-01 RX ADMIN — SODIUM CHLORIDE 7.5 MG/HR: 9 INJECTION, SOLUTION INTRAVENOUS at 16:56

## 2022-07-01 RX ADMIN — SODIUM CHLORIDE 2.5 MG/HR: 9 INJECTION, SOLUTION INTRAVENOUS at 04:17

## 2022-07-01 RX ADMIN — LOSARTAN POTASSIUM 50 MG: 50 TABLET, FILM COATED ORAL at 06:47

## 2022-07-01 RX ADMIN — ACETAMINOPHEN 650 MG: 325 TABLET ORAL at 13:50

## 2022-07-01 RX ADMIN — SODIUM CHLORIDE, PRESERVATIVE FREE 10 ML: 5 INJECTION INTRAVENOUS at 22:00

## 2022-07-01 NOTE — PROGRESS NOTES
Reason for Admission:  Chest pain, Hypertensive emergency, Atypical chest pain                     RUR Score:                     Plan for utilizing home health:          PCP: First and Last name:  Unknown, Provider, PA     Name of Practice:    Are you a current patient: Yes/No:    Approximate date of last visit:    Can you participate in a virtual visit with your PCP:                     Current Advanced Directive/Advance Care Plan: Full Code      Healthcare Decision Maker:   Click here to complete [address] including selection of the Healthcare Decision Maker Relationship (ie \"Primary\")                             Transition of Care Plan:                      Patient is from [facility]. Ashley Locke is the point of contact @ [phone]. Ashley Locke fax [phone]. Clinicals faxed via [facility].             LISA Talley

## 2022-07-01 NOTE — PROGRESS NOTES
Admission Medication Reconciliation:    Information obtained from:  Transfer papers [facility])    Comments/Recommendations: Reviewed PTA medications and patient's allergies. Removed amlodipine 5 mg  Removed aspirin 81 mg chew  Removed vitamin b12 100 mcg  Removed omeprazole 40 mg  Removed levetiracetam 750 mg  Removed simvastatin 20 mg  Removed tamsulosin 0.4 mg        Allergies: Alteplase, Ciprofloxacin, Clindamycin, Doxycycline, Lisinopril, Nsaids (non-steroidal anti-inflammatory drug), and Sulfa (sulfonamide antibiotics)    Significant PMH/Disease States:   Past Medical History:   Diagnosis Date    Bipolar 1 disorder ([facility] [address]     Dyslipidemia     Hypertension     MI (myocardial infarction) ([facility] [address]     Seizures ([facility] [address]      Chief Complaint for this Admission:    Chief Complaint   Patient presents with    Chest Pain    Transfer Of Care     Prior to Admission Medications:   Prior to Admission Medications   Prescriptions Last Dose Informant Patient Reported? Taking? NIFEdipine ER (PROCARDIA XL) 90 mg ER tablet 2022 Transfer Papers Yes Yes   Sig: Take 90 mg by mouth daily. albuterol (PROVENTIL HFA, VENTOLIN HFA, PROAIR HFA) 90 mcg/actuation inhaler 22 Transfer Papers Yes Yes   Sig: Take 2 Puffs by inhalation two (2) times daily as needed for Wheezing. albuterol (PROVENTIL VENTOLIN) 2.5 mg /3 mL (0.083 %) nebu  Transfer Papers Yes Yes   Si.5 mg by Nebulization route two (2) times daily as needed for Wheezing or Shortness of Breath. atorvastatin (LIPITOR) 40 mg tablet 22 Transfer Papers Yes Yes   Sig: Take 40 mg by mouth daily. busPIRone (BUSPAR) 30 mg tablet 22 Transfer Papers Yes Yes   Sig: Take 30 mg by mouth two (2) times a day. carvediloL (COREG) 6.25 mg tablet 22 Transfer Papers Yes Yes   Sig: Take 6.25 mg by mouth two (2) times a day.    cloNIDine HCl (CATAPRES) 0.2 mg tablet 2022 Transfer Papers Yes Yes   Sig: Take  by mouth two (2) times daily as needed. STOP DATE 2022   clopidogreL (PLAVIX) 75 mg tab 22 Transfer Papers Yes Yes   Sig: Take 75 mg by mouth daily. fluticasone propion-salmeteroL (AirDuo RespiClick) 339-34 mcg/actuation aepb  Transfer Papers Yes Yes   Sig: Take 1 Puff by inhalation two (2) times a day. lanolin alcohol-mineral oil-petrolatum (Hydrocerin, with petrolatum,) topical cream 22 Transfer Papers Yes Yes   Sig: Apply  to affected area as needed for Dry Skin. APPLY TWICE DAILY   latanoprost (XALATAN) 0.005 % ophthalmic solution 22 Transfer Papers Yes Yes   Sig: Administer 1 Drop to both eyes nightly. multivitamin (ONE A DAY) tablet 22 Transfer Papers Yes Yes   Sig: Take 1 Tablet by mouth daily. nitroglycerin (NITROSTAT) 0.4 mg SL tablet 22 Transfer Papers Yes Yes   Si.4 mg by SubLINGual route every five (5) minutes as needed for Chest Pain.   sodium chloride (Deep Sea Nasal) 0.65 % nasal squeeze bottle  Transfer Papers Yes Yes   Si [place] by Both Nostrils route four (4) times daily.       Facility-Administered Medications: None       Nori Mcneil

## 2022-07-01 NOTE — ED NOTES
TRANSFER - OUT REPORT:    Verbal report given to LIVE Osman (name) on Tomasa Bonilla  being transferred to ICU2 (unit) for routine progression of care       Report consisted of patients Situation, Background, Assessment and   Recommendations(SBAR). Information from the following report(s) SBAR, ED Summary, MAR, Recent Results and Cardiac Rhythm NSR was reviewed with the receiving nurse. Lines:   Peripheral IV 06/30/22 Distal;Left Antecubital (Active)       Peripheral IV 06/30/22 Left;Posterior Forearm (Active)        Opportunity for questions and clarification was provided.       Patient transported with:   Monitor  Registered Nurse

## 2022-07-01 NOTE — ED TRIAGE NOTES
Patient transferred from [facility] for chest pain, patient on nitro drip on arrival

## 2022-07-01 NOTE — ED PROVIDER NOTES
EMERGENCY DEPARTMENT HISTORY AND PHYSICAL EXAM      Date: 6/30/2022  Patient Name: Ricky Verduzco      History of Presenting Illness     Chief Complaint   Patient presents with    Chest Pain    Transfer Of Care       History Provided By: Patient    HPI: Ricky Verduzco, 50 y.o. male with a past medical history significant Hypertension, MI, CAD status post AICD, CVA presents to the ED with cc of retention and chest pain. Patient states despite taking his medications today his blood pressure is very high. He is having chest pain. Seen at the outside hospital and started on nitro drip. States his chest pain went from 10 out of 10 to 3 out of 10 now. He denies shortness of breath. He is otherwise been in his normal state of health. He states his defibrillator has shocked him earlier this month but nothing today. His cardiologist is at [facility]. There are no other complaints, changes, or physical findings at this time.     PCP: Unknown, Provider, PA    Current Facility-Administered Medications   Medication Dose Route Frequency Provider Last Rate Last Admin    aspirin chewable tablet 81 mg  81 mg Oral DAILY Anna Martin MD        cloNIDine HCL (CATAPRES) tablet 0.2 mg  0.2 mg Oral BID Anna Martin MD        levETIRAcetam (KEPPRA) tablet 750 mg  750 mg Oral BID Deric Baker MD        pantoprazole (PROTONIX) tablet 40 mg  40 mg Oral ACB Anna Martin MD        atorvastatin (LIPITOR) tablet 10 mg  10 mg Oral QHS Deric Baker MD   10 mg at 07/01/22 0104    tamsulosin (FLOMAX) capsule 0.4 mg  0.4 mg Oral DAILY Anna Martin MD        sodium chloride (NS) flush 5-40 mL  5-40 mL IntraVENous Q8H Anna Martin MD        sodium chloride (NS) flush 5-40 mL  5-40 mL IntraVENous PRN Deric Baker MD        acetaminophen (TYLENOL) tablet 650 mg  650 mg Oral Q6H PRN Deric Baker MD   650 mg at 07/01/22 2616    Or    acetaminophen (TYLENOL) suppository 650 mg  650 mg Rectal Q6H PRN MD Sindhu Harvey polyethylene glycol (MIRALAX) packet 17 g  17 g Oral DAILY PRN Magdalena Martin MD        ondansetron (ZOFRAN ODT) tablet 4 mg  4 mg Oral Q8H PRN Magdalena Martin MD        Or    [facility] injection 4 mg  4 mg IntraVENous Q6H PRN Arin Orta MD   4 mg at 07/01/22 0103    enoxaparin (LOVENOX) injection 40 mg  40 mg SubCUTAneous DAILY Magdalena Martin MD        amLODIPine (NORVASC) tablet 10 mg  10 mg Oral DAILY Arin Orta MD        niCARdipine (CARDENE) 25 mg in 0.9% sodium chloride 250 mL ([id number])  5-15 mg/hr IntraVENous TITRATE Arin Orta MD 25 mL/hr at 07/01/22 0417 2.5 mg/hr at 07/01/22 0417    losartan (COZAAR) tablet 50 mg  50 mg Oral DAILY Arin Orta MD   50 mg at 07/01/22 3555     Current Outpatient Medications   Medication Sig Dispense Refill    amLODIPine (NORVASC) 5 mg tablet Take 1 Tab by mouth daily. 30 Tab 0    aspirin 81 mg chewable tablet Take 81 mg by mouth daily.  cloNIDine HCl (CATAPRES) 0.2 mg tablet Take  by mouth two (2) times a day.  simvastatin (ZOCOR) 20 mg tablet Take  by mouth nightly.  cyanocobalamin (VITAMIN B12) 100 mcg tablet Take 50 mcg by mouth daily.  levETIRAcetam (KEPPRA) 750 mg tablet Take 750 mg by mouth two (2) times a day.  omeprazole (PRILOSEC) 40 mg capsule Take 40 mg by mouth daily.  tamsulosin (FLOMAX) 0.4 mg capsule Take 0.4 mg by mouth daily.  nitroglycerin (NITROSTAT) 0.4 mg SL tablet by SubLINGual route every five (5) minutes as needed for Chest Pain. Past History     Past Medical History:  Past Medical History:   Diagnosis Date    [facility] [address]     Dyslipidemia     Hypertension     [facility]     [facility]iz[facility]        Past Surgical History:  No past surgical history on file. Family History:  History reviewed. No pertinent family history.     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 HENT: Negative for rhinorrhea and sore throat. Eyes: Negative for visual disturbance. Respiratory: Negative for cough and shortness of breath. Cardiovascular: Positive for chest pain. Gastrointestinal: Negative for abdominal pain, diarrhea, nausea and vomiting. Genitourinary: Negative for dysuria. Musculoskeletal: Negative for arthralgias and myalgias. Skin: Negative for rash. Neurological: Negative for headaches. Psychiatric/Behavioral: Negative for confusion. All other systems reviewed and are negative. Physical Exam     Physical Exam  Vitals and nursing note reviewed. Constitutional:       General: He is not in acute distress. Appearance: Normal appearance. He is not toxic-appearing or diaphoretic. HENT:      Head: Normocephalic and atraumatic. Eyes:      Pupils: Pupils are equal, round, and reactive to light. Cardiovascular:      Rate and Rhythm: Normal rate and regular rhythm. Pulses: Normal pulses. Pulmonary:      Effort: Pulmonary effort is normal.   Musculoskeletal:         General: No swelling or deformity. Skin:     Coloration: Skin is not pale. Findings: No rash. Neurological:      General: No focal deficit present. Mental Status: He is alert and oriented to person, place, and time. Sensory: No sensory deficit. Motor: No weakness.    Psychiatric:         Mood and Affect: Mood normal.         Behavior: Behavior normal.         Lab and Diagnostic Study Results     Labs -     Recent Results (from the past 12 hour(s))   TROPONIN-HIGH SENSITIVITY    Collection Time: 06/30/22 11:17 PM   Result Value Ref Range    Troponin-High Sensitivity 7 0 - 76 ng/L       Radiologic Studies -   [unfilled]  CT Results  (Last 48 hours)    None        CXR Results  (Last 48 hours)               06/30/22 1312  XR CHEST PORT Final result    Impression:  The cardiomediastinal silhouette is appropriate for age, technique,   and lung expansion. Pulmonary vasculature is not congested. The lungs are   essentially clear. No effusion or pneumothorax is seen. Narrative:  1 view       Left subclavian pacemaker                 Medical Decision Making and ED Course   - I am the first and primary provider for this patient AND AM THE PRIMARY PROVIDER OF RECORD. - I reviewed the vital signs, available nursing notes, past medical history, past surgical history, family history and social history. - Initial assessment performed. The patients presenting problems have been discussed, and the staff are in agreement with the care plan formulated and outlined with them. I have encouraged them to ask questions as they arise throughout their visit. Vital Signs-Reviewed the patient's vital signs.     Patient Vitals for the past 12 hrs:   Temp Pulse Resp BP SpO2   07/01/22 0647 -- 77 12 (!) 125/94 94 %   07/01/22 0632 -- 84 14 139/82 95 %   07/01/22 0617 -- 82 13 126/83 95 %   07/01/22 0602 -- 77 16 128/80 94 %   07/01/22 0547 -- 76 15 127/80 94 %   07/01/22 0532 -- 76 13 131/77 94 %   07/01/22 0517 -- 80 19 (!) 168/138 92 %   07/01/22 0502 98.2 °F (36.8 °C) 81 16 127/81 91 %   07/01/22 0447 -- 74 19 131/82 93 %   07/01/22 0432 -- 83 19 138/88 95 %   07/01/22 0417 -- 79 21 (!) 240/191 96 %   07/01/22 0402 -- 81 16 (!) 240/191 97 %   07/01/22 0315 -- 81 16 -- 96 %   07/01/22 0215 -- 79 14 (!) 139/99 95 % 07/01/22 0030 -- 76 18 (!) 141/112 96 %   06/30/22 2315 -- 75 15 (!) 133/102 95 %   06/30/22 2215 -- 77 -- (!) 141/94 --   06/30/22 2207 -- 73 12 (!) 141/89 98 %   06/30/22 2137 -- 74 18 (!) 125/103 91 %         Records Reviewed: Nursing Notes    ED Course:       ED Course as of 07/01/22 0750   u Jun 30, 2022   2312 50 yp M presents for evaluation of chest pain. Was seen earlier today at [facility]. He has found to have significantly elevated blood pressures and was treated for hypertensive emergency with a nitro drip. On arrival here patient states his chest pain is significantly improved however he states that he has having headache and nausea. His blood pressure is significantly lower than when he first presented, initially 125/103. I stopped the nitro drip. Gave patient 1000 g of Tylenol and applied 1 inch of Nitropaste instead. Will trend troponin. Patient will require admission. [LW]      ED Course User Index  [LW] Marsha Palacios MD     EKG performed at 2139, read at 2149. Sinus rhythm with first-degree AV block. 64.  Normal MO, QRS, normal QTC. Consultations:       Consultations: -  Hospitalist Consultant: Dr. Adi Peterson: We have asked for emergent assistance with regard to this patient. We have discussed the patients HPI, ROS, PE and results this far. They will come and evaluate the patient for admission. Disposition     Disposition: DC- Adult Discharges: All of the diagnostic tests were reviewed and questions answered. Diagnosis, care plan and treatment options were discussed. The patient understands the instructions and will follow up as directed. The patients results have been reviewed with them. They have been counseled regarding their diagnosis. The patient verbally convey understanding and agreement of the signs, symptoms, diagnosis, treatment and prognosis and additionally agrees to follow up as recommended with their PCP in 24 - 48 hours.   They also agree with the care-plan and convey that all of their questions have been answered. I have also put together some discharge instructions for them that include: 1) educational information regarding their diagnosis, 2) how to care for their diagnosis at home, as well a 3) list of reasons why they would want to return to the ED prior to their follow-up appointment, should their condition change. Diagnosis     Clinical Impression:   1. Hypertension, unspecified type        Attestations:    eMlvin Lara MD    Please note that this dictation was completed with WinView, the [employer] ([employer]) voice recognition software. Quite often unanticipated grammatical, syntax, homophones, and other interpretive errors are inadvertently transcribed by the computer software. Please disregard these errors. Please excuse any errors that have escaped final proofreading. Thank you.

## 2022-07-01 NOTE — H&P
History and Physical    Patient: Med Cooney MRN: [id number]  SSN: [id number]    YOB: 1970  Age: 50 y.o. Sex: male      Subjective:      Med Cooney is a 50 y.o. male with PMH of CAD s/p CPI, ? CHF s/p defibrillator , hypertension and HLP . Presented to the ED with chief complaint of of chest pain started this morning. State pain started from lower abdomen, radiates to chest then neck. Burning and tight in nature. 10/10 at maximum. Not related to dyspnea, nausea or diaphoresis. Trial of SL nitro, but did not improve symptoms and gave him headache. In the ED, noted elevated BP and started on BP. However, however over-corrected and patient complain of headache. Stopped nitro ggt. Patient currently reports only chest tightness, 5/10. Past Medical History:   Diagnosis Date    [facility] ([facility] [address]     Dyslipidemia     Hypertension     MI (myocardial infarction) ([facility] [address]     Seizures ([facility] [address]      No past surgical history on file. History reviewed. No pertinent family history. Social History     Tobacco Use    Smoking status: Unknown If Ever Smoked    Smokeless tobacco: Not on file   Substance Use Topics    Alcohol use: No      Prior to Admission medications    Medication Sig Start Date End Date Taking? Authorizing Provider   amLODIPine (NORVASC) 5 mg tablet Take 1 Tab by mouth daily. 5/2/16   Moira Siddiqui MD   aspirin 81 mg chewable tablet Take 81 mg by mouth daily. Malorie Osborne MD   cloNIDine HCl (CATAPRES) 0.2 mg tablet Take  by mouth two (2) times a day. Malorie Osborne MD   simvastatin (ZOCOR) 20 mg tablet Take  by mouth nightly. Malorie Osborne MD   cyanocobalamin (VITAMIN B12) 100 mcg tablet Take 50 mcg by mouth daily. Malroie Osborne MD   levETIRAcetam (KEPPRA) 750 mg tablet Take 750 mg by mouth two (2) times a day. Malorie Osborne MD   omeprazole (PRILOSEC) 40 mg capsule Take 40 mg by mouth daily.     Malorie Osborne MD   tamsulosin (FLOMAX) 0.4 mg capsule Take 0.4 mg by mouth daily. Dylon, MD Malorie   nitroglycerin (NITROSTAT) 0.4 mg SL tablet by SubLINGual route every five (5) minutes as needed for Chest Pain. 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    Sulfa (Sulfonamide Antibiotics) Unknown (comments)       Review of Systems:   Constitutional: No fevers, No chills, No fatigue, No weakness  Eyes: No visual disturbance  Ears, Nose, Mouth, Throat, and Face: No nasal congestion, No sore throat  Respiratory: No cough, No sputum, No wheezing, No SOB  Cardiovascular: See HPI   Gastrointestinal: No nausea, No vomiting, No diarrhea, No constipation, No abdominal pain  Genitourinary: No frequency, No dysuria, No hematuria  Integument/Breast: No rash, No skin lesion(s), No dryness  Musculoskeletal: No arthralgias, No neck pain, No back pain  Neurological: No headaches, No dizziness, No confusion,  No seizures  Behavioral/Psychiatric: No anxiety, No depression      Objective:     Vitals:    06/30/22 2137 06/30/22 2207 06/30/22 2215 06/30/22 2315   BP: (!) 125/103 (!) 141/89 (!) 141/94 (!) 133/102   Pulse: 74 73 77 75   Resp: 18 12  15   SpO2: 91% 98%  95%   Weight: 91.2 kg (201 lb)      Height: 6' (1.829 m)           Physical Exam:   General: alert, cooperative, no distress  Eye: conjunctivae/corneas clear. PERRL, EOM's intact. Throat and Neck: normal and no erythema or exudates noted. No mass   Lung: clear to auscultation bilaterally  Heart: regular rate and rhythm, no murmur  Abdomen: soft, non-tender. Bowel sounds normal. No masses,  Extremities:  able to move all extremities normal, atraumatic  Skin: Normal.  Neurologic: AOx3. Motor function and sensation grossly intact.   Psychiatric: non focal    Recent Results (from the past 24 hour(s))   EKG, 12 LEAD, INITIAL    Collection Time: 06/30/22 12:28 PM   Result Value Ref Range    Ventricular Rate 62 BPM    Atrial Rate 62 BPM    P-R Interval 163 ms    QRS Duration 82 ms    Q-T Interval 393 ms    QTC Calculation (Bezet) 399 ms    Calculated P Axis 19 degrees    Calculated R Axis -4 degrees    Calculated T Axis 6 degrees    Diagnosis       Atrial-paced rhythm  Baseline wander in lead(s) I,III,aVR,aVL     CBC WITH AUTOMATED DIFF    Collection Time: 06/30/22 12:59 PM   Result Value Ref Range    WBC 6.4 4.4 - 11.3 K/uL    RBC 5.25 4.50 - 5.90 M/uL    HGB 13.8 13.5 - 17.5 g/dL    HCT 43.5 41 - 53 %    MCV 82.8 80 - 100 FL    MCH 26.3 (L) 31 - 34 PG    MCHC 31.7 31.0 - 36.0 g/dL    RDW 13.5 11.5 - 14.5 %    PLATELET 822 419 - 891 K/uL    MPV 8.0 6.5 - 11.5 FL    NRBC 0.1  WBC    ABSOLUTE NRBC 0.01 K/uL    NEUTROPHILS 54 42 - 75 %    LYMPHOCYTES 35 20.5 - 51.1 %    MONOCYTES 10 (H) 1.7 - 9.3 %    EOSINOPHILS 1 0.9 - 2.9 %    BASOPHILS 0 0.0 - 2.5 %    ABS. NEUTROPHILS 3.4 1.8 - 7.7 K/UL    ABS. LYMPHOCYTES 2.2 1.0 - 4.8 K/UL    ABS. MONOCYTES 0.7 0.2 - 2.4 K/UL    ABS. EOSINOPHILS 0.1 0.0 - 0.7 K/UL    ABS.  BASOPHILS 0.0 0.0 - 0.2 K/UL   PROTHROMBIN TIME + INR    Collection Time: 06/30/22 12:59 PM   Result Value Ref Range    Prothrombin time 13.2 11.9 - 14.6 sec    INR 1.0 0.9 - 1.1     METABOLIC PANEL, COMPREHENSIVE    Collection Time: 06/30/22 12:59 PM   Result Value Ref Range    Sodium 140 136 - 145 mmol/L    Potassium 3.8 3.5 - 5.1 mmol/L    Chloride 104 97 - 108 mmol/L    CO2 30 21 - 32 mmol/L    Anion gap 6 5 - 15 mmol/L    Glucose 86 65 - 100 mg/dL    BUN 9 6 - 20 mg/dL    Creatinine 1.15 0.70 - 1.30 mg/dL    BUN/Creatinine ratio 8 (L) 12 - 20      GFR est AA >60 >60 ml/min/1.73m2    GFR est non-AA >60 >60 ml/min/1.73m2    Calcium 9.1 8.5 - 10.1 mg/dL    Bilirubin, total 0.7 0.2 - 1.0 mg/dL    AST (SGOT) 15 15 - 37 U/L    ALT (SGPT) 22 12 - 78 U/L Alk. phosphatase 69 45 - 117 U/L    Protein, total 7.6 6.4 - 8.2 g/dL    Albumin 3.9 3.5 - 5.0 g/dL    Globulin 3.7 2.0 - 4.0 g/dL    A-G Ratio 1.1 1.1 - 2.2     NT-PRO BNP    Collection Time: 06/30/22 12:59 PM   Result Value Ref Range    NT pro-BNP 49 <125 pg/mL   TROPONIN-HIGH SENSITIVITY    Collection Time: 06/30/22 12:59 PM   Result Value Ref Range    Troponin-High Sensitivity 14 0 - 76 ng/L   MAGNESIUM    Collection Time: 06/30/22 12:59 PM   Result Value Ref Range    Magnesium 2.2 1.6 - 2.4 mg/dL   DRUG SCREEN, URINE    Collection Time: 06/30/22 12:59 PM   Result Value Ref Range    AMPHETAMINES Negative Negative      BARBITURATES Negative Negative      BENZODIAZEPINES Negative Negative      COCAINE Negative Negative      ECSTASY, MDMA Negative Negative      METHADONE Negative Negative      OPIATES Negative Negative      PCP(PHENCYCLIDINE) Negative Negative      THC (TH-CANNABINOL) Negative Negative      Drug screen comment PH 6.5    URINALYSIS W/ RFLX MICROSCOPIC    Collection Time: 06/30/22 12:59 PM   Result Value Ref Range    Color Yellow/Straw      Appearance Clear Clear      Specific gravity 1.020 1.003 - 1.030      pH (UA) 6.5 5.0 - 8.0      Protein Negative Negative mg/dL    Glucose Negative Negative mg/dL    Ketone Negative Negative mg/dL    Bilirubin Negative Negative      Blood Trace (A) Negative      Urobilinogen 0.2 0.2 - 1.0 EU/dL    Nitrites Negative Negative      Leukocyte Esterase Negative Negative     URINE MICROSCOPIC    Collection Time: 06/30/22 12:59 PM   Result Value Ref Range    WBC 0-4 0 - 5 /hpf    RBC 0-5 0 - 3 /hpf    Bacteria Negative Negative /hpf   TROPONIN-HIGH SENSITIVITY    Collection Time: 06/30/22  2:44 PM   Result Value Ref Range    Troponin-High Sensitivity 15 0 - 76 ng/L       XR Results (maximum last 3):   Results from Hospital Encounter encounter on 06/30/22    XR CHEST PORT    Narrative  1 view    Left subclavian pacemaker    Impression  The cardiomediastinal silhouette is appropriate for age, technique,  and lung expansion. Pulmonary vasculature is not congested. The lungs are  essentially clear. No effusion or pneumothorax is seen. Results from [place] encounter on 04/30/16    XR CHEST SNGL V    Narrative  Chest radiograph frontal view    INDICATION: Altered mental status    COMPARISON: 1/9/2016    Impression  :  Low lung volumes accentuate the cardiac outline and cause crowding of the  pulmonary vasculature. Given this caveat, there remains haziness of the  pulmonary vasculature as can be seen with mild pulmonary edema. CT Results (maximum last 3): Results from [place] encounter on 04/30/16    CT SPINE CERV WO CONT    Narrative  CERVICAL SPINE CT:    HISTORY:  Altered mental status    TECHNIQUE:  Contiguous axial images were performed of the cervical spine without  contrast. Sagittal and coronal reformatted images were performed. Initial  interpretation by [facility] radiology. COMPARISON:  None. FINDINGS:    No acute fracture or subluxation. Occipital condyles are intact. Dens is  intact. Normal alignment of facet joints without widening or subluxation. Prevertebral and paravertebral soft tissues are normal.    Multilevel mild degenerative changes throughout the cervical spine. Visualized lung apices are clear. Impression  :  No evidence of acute fracture. CT HEAD WO CONT    Narrative  CT HEAD WITHOUT CONTRAST:    HISTORY:  Altered mental status    TECHNIQUE:  Contiguous axial images of the brain were obtained displayed with  both soft tissue and bone window. Coronal and sagittal reformations were also  performed. Initial interpretation by [facility] radiology. COMPARISON: None. FINDINGS:    No abnormal intra or extra axial fluid collections, hydrocephalus, midline  shift, or mass effect. No CT evidence of acute ischemia. No basilar cistern or  sulcal effacement.     There is opacification of an atrophic right maxillary sinus. Mastoid air cells  appear clear. Impression  :  No acute intracranial process. MRI Results (maximum last 3): No results found for this or any previous visit. Nuclear Medicine Results (maximum last 3): No results found for this or any previous visit. US Results (maximum last 3): No results found for this or any previous visit. Assessment and plan:   # Chest pain  - Atypical. No elevated troponin or BNP. Possibly secondary to hypertension emergency  - However given significant cardiac history, will admit for observation  - ECHO  - Consult cardiology    # Hypertension emergency  - Hold nitro ggt  - Monitor overnight. Keep BP approx 160/100  - Restart home medications, increase amlodipine to 10.  - Adjust as needed. # History of seizure  - Continue home medications. # Full code by default, need further clarification    # Medication list reviewed on Epic and/or outside documentation. Not reviewed with patient.         Signed By: Michel Dangelo MD     July 1, 2022

## 2022-07-01 NOTE — CONSULTS
Consult    Patient: Cr Graham MRN: [id number]  SSN: [id number]    YOB: 1970  Age: 50 y.o. Sex: male       Subjective:      Date of  Admission: 6/30/2022     Admission type: Emergency    Cr Graham is a 50 y.o. male with a history of hypertension, permanent pacemaker placement in the setting of syncope, bradycardia who is an inmate of [place] [address]. He presented to the hospital with a multitude of complaints including headache, left leg pain, chest pain. Blood pressure has been significantly elevated. He has no chest pain when seen by me but reports severe headache. There is no history of orthopnea, paroxysmal nocturnal dyspnea, dizziness, lightheadedness or syncope. Primary Care Provider: Unknown, Provider, PA  Past Medical History:   Diagnosis Date    Bipolar 1 disorder ([facility] [address]     Dyslipidemia     Hypertension          [facility]       No past surgical history on file. History reviewed. No pertinent family history.    Social History     Tobacco Use    Smoking status: Unknown If Ever Smoked    Smokeless tobacco: Not on file   Substance Use Topics    Alcohol use: No      Current Facility-Administered Medications   Medication Dose Route Frequency    aspirin chewable tablet 81 mg  81 mg Oral DAILY    cloNIDine HCL (CATAPRES) tablet 0.2 mg  0.2 mg Oral BID    levETIRAcetam (KEPPRA) tablet 750 mg  750 mg Oral BID    pantoprazole (PROTONIX) tablet 40 mg  40 mg Oral ACB    atorvastatin (LIPITOR) tablet 10 mg  10 mg Oral QHS    tamsulosin (FLOMAX) capsule 0.4 mg  0.4 mg Oral DAILY    sodium chloride (NS) flush 5-40 mL  5-40 mL IntraVENous Q8H    sodium chloride (NS) flush 5-40 mL  5-40 mL IntraVENous PRN    acetaminophen (TYLENOL) tablet 650 mg  650 mg Oral Q6H PRN    Or    acetaminophen (TYLENOL) suppository 650 mg  650 mg Rectal Q6H PRN    polyethylene glycol (MIRALAX) packet 17 g  17 g Oral DAILY PRN    ondansetron (ZOFRAN ODT) tablet 4 mg  4 mg Oral Q8H PRN    Or    ondansetron (ZOFRAN) injection 4 mg  4 mg IntraVENous Q6H PRN    enoxaparin (LOVENOX) injection 40 mg  40 mg SubCUTAneous DAILY    amLODIPine (NORVASC) tablet 10 mg  10 mg Oral DAILY    niCARdipine (CARDENE) 25 mg in 0.9% sodium chloride 250 mL ([id number])  5-15 mg/hr IntraVENous TITRATE    losartan (COZAAR) tablet 50 mg  50 mg Oral DAILY     Current Outpatient Medications   Medication Sig    amLODIPine (NORVASC) 5 mg tablet Take 1 Tab by mouth daily.  aspirin 81 mg chewable tablet Take 81 mg by mouth daily.  cloNIDine HCl (CATAPRES) 0.2 mg tablet Take  by mouth two (2) times a day.  simvastatin (ZOCOR) 20 mg tablet Take  by mouth nightly.  cyanocobalamin (VITAMIN B12) 100 mcg tablet Take 50 mcg by mouth daily.  levETIRAcetam (KEPPRA) 750 mg tablet Take 750 mg by mouth two (2) times a day.  omeprazole (PRILOSEC) 40 mg capsule Take 40 mg by mouth daily.  tamsulosin (FLOMAX) 0.4 mg capsule Take 0.4 mg by mouth daily.  nitroglycerin (NITROSTAT) 0.4 mg SL tablet by SubLINGual route every five (5) minutes as needed for Chest Pain. 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        Subjective:     Visit Vitals  BP (!) 131/93   Pulse 84   Temp 98.2 °F (36.8 °C)   Resp 18   Ht 6' (1.829 m)   Wt 91.2 kg (201 lb)   SpO2 95%   BMI 27.26 kg/m²        Physical Exam:  Visit Vitals  BP (!) 131/93   Pulse 84   Temp 98.2 °F (36.8 °C)   Resp 18   Ht 6' (1.829 m)   Wt 91.2 kg (201 lb)   SpO2 95%   BMI 27.26 kg/m²     General Appearance: Well developed, well nourished,alert and oriented x 3, and individual in no acute distress. Ears/Nose/Mouth/Throat:   Hearing grossly normal.         Neck: Supple. Chest:   Lungs clear to auscultation bilaterally. Cardiovascular:  Regular rate and rhythm, S1, S2 normal, no murmur. Abdomen:   Soft, non-tender, bowel sounds are active. Extremities: No edema bilaterally. Skin: Warm and dry. Cardiographics:  Telemetry: normal sinus rhythm    Data Reviewed: BMP: No results found for: NA, K, CL, CO2, AGAP, GLU, BUN, CREA, GFRAA, GFRNA  CMP: No results found for: NA, K, CL, CO2, AGAP, GLU, BUN, CREA, GFRAA, GFRNA, CA, MG, PHOS, ALB, TBIL, TP, ALB, GLOB, AGRAT, ALT  CBC: No results found for: WBC, HGB, HGBEXT, HCT, HCTEXT, PLT, PLTEXT, HGBEXT, HCTEXT, PLTEXT  All Cardiac Markers in the last 24 hours: No results found for: CPK, CK, CKMMB, CKMB, RCK3, CKMBT, CKNDX, CKND1, RALPH, TROPT, TROIQ, RAUDEL, TROPT, TNIPOC, BNP, BNPP  Recent Glucose Results: No results found for: GLU  ABG: No results found for: PH, PHI, PCO2, PCO2I, PO2, PO2I, HCO3, HCO3I, FIO2, FIO2I  COAGS: No results found for: APTT, PTP, INR, INREXT, INREXT  Liver Panel: No results found for: ALB, CBIL, TBIL, TP, GLOB, AGRAT, ASTPOC, ALTPOC, ALT, AP     Assessment:   Atypical chest pain  Hypertensive urgency  Status post permanent pacemaker     Plan:   53-year-old male with a past medical history as above who is seen for chest pain  -Patient with atypical chest pain. EKG shows no ischemic changes. Troponins have been negative. Echocardiogram shows preserved LV systolic function, no wall motion abnormalities. He has never had ischemia work-up before. He does have risk factors for coronary disease however I think his chest pain is probably related to uncontrolled blood pressures.   It would not be unreasonable to consider stress testing, possibly as an outpatient given the upcoming long weekend.  -He is currently on nicardipine infusion which should be weaned off as tolerated. Patient reports not taking multiple medications that the med reconciliation is showing. He says he takes Coreg which I have added to his orders. He says he takes clonidine only as needed. He has been started on 0.2 mg p.o. twice daily.   Will need to get an updated list from [facility]  -We will aim to manage his blood pressure, optimize his antihypertensive medications and then plan for stress testing potentially as an outpatient    Thank you for allowing us to participate in the care of your patient

## 2022-07-01 NOTE — PROGRESS NOTES
Hospitalist Progress Note    Subjective:   Daily Progress Note: 7/1/2022 10:53 AM    Hospital Course:  Porsha Christianson is a 68-year-old male with PMHx of CAD s/p PCI, CHF s/p defibrillator , hypertension and HLP who presented to the ED with chief complaint of of chest pain started this morning. State pain started from lower abdomen, radiates to chest then neck. Burning and tight in nature. 10/10 at maximum. Not related to dyspnea, nausea or diaphoresis. Trial of SL nitro, but did not improve symptoms and gave him headache. In the ED, noted elevated BP and started on nicardipine gtt. Patient currently reports only chest tightness, 5/10. CBC and CMP unremarkable. Troponin negative x3. EKG showing no signs of ischemia. Chest x-ray negative for acute pulmonary infiltrates. Cardiology consult. Patient will require pacemaker interrogation. Subjective:    Patient seen and examined at bedside. Reports some improvement in chest tightness.      Current Facility-Administered Medications   Medication Dose Route Frequency    aspirin chewable tablet 81 mg  81 mg Oral DAILY    cloNIDine HCL (CATAPRES) tablet 0.2 mg  0.2 mg Oral BID    levETIRAcetam (KEPPRA) tablet 750 mg  750 mg Oral BID    pantoprazole (PROTONIX) tablet 40 mg  40 mg Oral ACB    atorvastatin (LIPITOR) tablet 10 mg  10 mg Oral QHS    tamsulosin (FLOMAX) capsule 0.4 mg  0.4 mg Oral DAILY    sodium chloride (NS) flush 5-40 mL  5-40 mL IntraVENous Q8H    sodium chloride (NS) flush 5-40 mL  5-40 mL IntraVENous PRN    acetaminophen (TYLENOL) tablet 650 mg  650 mg Oral Q6H PRN    Or    acetaminophen (TYLENOL) suppository 650 mg  650 mg Rectal Q6H PRN    polyethylene glycol (MIRALAX) packet 17 g  17 g Oral DAILY PRN    ondansetron (ZOFRAN ODT) tablet 4 mg  4 mg Oral Q8H PRN    Or    ondansetron (ZOFRAN) injection 4 mg  4 mg IntraVENous Q6H PRN    enoxaparin (LOVENOX) injection 40 mg  40 mg SubCUTAneous DAILY    amLODIPine (NORVASC) tablet 10 mg  10 mg Oral DAILY    niCARdipine (CARDENE) 25 mg in 0.9% sodium chloride 250 mL ([id number])  5-15 mg/hr IntraVENous TITRATE    losartan (COZAAR) tablet 50 mg  50 mg Oral DAILY     Current Outpatient Medications   Medication Sig    amLODIPine (NORVASC) 5 mg tablet Take 1 Tab by mouth daily.  aspirin 81 mg chewable tablet Take 81 mg by mouth daily.  cloNIDine HCl (CATAPRES) 0.2 mg tablet Take  by mouth two (2) times a day.  simvastatin (ZOCOR) 20 mg tablet Take  by mouth nightly.  cyanocobalamin (VITAMIN B12) 100 mcg tablet Take 50 mcg by mouth daily.  levETIRAcetam (KEPPRA) 750 mg tablet Take 750 mg by mouth two (2) times a day.  omeprazole (PRILOSEC) 40 mg capsule Take 40 mg by mouth daily.  tamsulosin (FLOMAX) 0.4 mg capsule Take 0.4 mg by mouth daily.  nitroglycerin (NITROSTAT) 0.4 mg SL tablet by SubLINGual route every five (5) minutes as needed for Chest Pain. Review of Systems  Constitutional: No fevers, No chills, No sweats, No fatigue, No Weakness  Eyes: No redness  Ears, nose, mouth, throat, and face: No nasal congestion, No sore throat, No voice change  Respiratory: No Shortness of Breath, No cough, No wheezing  Cardiovascular: + chest pain, No palpitations, No extremity edema  Gastrointestinal: No nausea, No vomiting, No diarrhea, No abdominal pain  Genitourinary: No frequency, No dysuria, No hematuria  Integument/breast: No skin lesion(s)   Neurological: No Confusion, No headaches, No dizziness      Objective:     Visit Vitals  BP (!) 131/93   Pulse 84   Temp 98.2 °F (36.8 °C)   Resp 18   Ht 6' (1.829 m)   Wt 91.2 kg (201 lb)   SpO2 95%   BMI 27.26 kg/m²      O2 Device: None (Room air)    Temp (24hrs), Av.6 °F (37 °C), Min:98.2 °F (36.8 °C), Max:98.9 °F (37.2 °C)      No intake/output data recorded. No intake/output data recorded. PHYSICAL EXAM:  Constitutional: No acute distress  Skin: Extremities and face reveal no rashes. HEENT: Sclerae anicteric.  Extra-occular muscles are intact. No oral ulcers. The neck is supple and no masses. Cardiovascular: Regular rate and rhythm. +S1/S2. No murmur or gallop. No JVD. Respiratory:  Clear breath sounds bilaterally with no wheezes, rales, or rhonchi. GI: Abdomen nondistended, soft, and nontender. Normal active bowel sounds. Rectal: Deferred   Musculoskeletal: No pitting edema of the lower legs. Able to move all ext  Neurological:  Patient is alert and oriented x3. Cranial nerves II-XII grossly intact  Psychiatric: Mood appears appropriate       Data Review    Recent Results (from the past 24 hour(s))   EKG, 12 LEAD, INITIAL    Collection Time: 06/30/22 12:28 PM   Result Value Ref Range    Ventricular Rate 62 BPM    Atrial Rate 62 BPM    P-R Interval 163 ms    QRS Duration 82 ms    Q-T Interval 393 ms    QTC Calculation (Bezet) 399 ms    Calculated P Axis 19 degrees    Calculated R Axis -4 degrees    Calculated T Axis 6 degrees    Diagnosis       Atrial-paced rhythm  Baseline wander in lead(s) I,III,aVR,aVL    Confirmed by Aldo Doan M.D., Bonnie Reed ([id number]) on 7/1/2022 10:32:51 AM     CBC WITH AUTOMATED DIFF    Collection Time: 06/30/22 12:59 PM   Result Value Ref Range    WBC 6.4 4.4 - 11.3 K/uL    RBC 5.25 4.50 - 5.90 M/uL    HGB 13.8 13.5 - 17.5 g/dL    HCT 43.5 41 - 53 %    MCV 82.8 80 - 100 FL    MCH 26.3 (L) 31 - 34 PG    MCHC 31.7 31.0 - 36.0 g/dL    RDW 13.5 11.5 - 14.5 %    PLATELET 556 863 - 374 K/uL    MPV 8.0 6.5 - 11.5 FL    NRBC 0.1  WBC    ABSOLUTE NRBC 0.01 K/uL    NEUTROPHILS 54 42 - 75 %    LYMPHOCYTES 35 20.5 - 51.1 %    MONOCYTES 10 (H) 1.7 - 9.3 %    EOSINOPHILS 1 0.9 - 2.9 %    BASOPHILS 0 0.0 - 2.5 %    ABS. NEUTROPHILS 3.4 1.8 - 7.7 K/UL    ABS. LYMPHOCYTES 2.2 1.0 - 4.8 K/UL    ABS. MONOCYTES 0.7 0.2 - 2.4 K/UL    ABS. EOSINOPHILS 0.1 0.0 - 0.7 K/UL    ABS.  BASOPHILS 0.0 0.0 - 0.2 K/UL   PROTHROMBIN TIME + INR    Collection Time: 06/30/22 12:59 PM   Result Value Ref Range    Prothrombin time 13.2 11.9 - 14.6 sec    INR 1.0 0.9 - 1.1     METABOLIC PANEL, COMPREHENSIVE    Collection Time: 06/30/22 12:59 PM   Result Value Ref Range    Sodium 140 136 - 145 mmol/L    Potassium 3.8 3.5 - 5.1 mmol/L    Chloride 104 97 - 108 mmol/L    CO2 30 21 - 32 mmol/L    Anion gap 6 5 - 15 mmol/L    Glucose 86 65 - 100 mg/dL    BUN 9 6 - 20 mg/dL    Creatinine 1.15 0.70 - 1.30 mg/dL    BUN/Creatinine ratio 8 (L) 12 - 20      GFR est AA >60 >60 ml/min/1.73m2    GFR est non-AA >60 >60 ml/min/1.73m2    Calcium 9.1 8.5 - 10.1 mg/dL    Bilirubin, total 0.7 0.2 - 1.0 mg/dL    AST (SGOT) 15 15 - 37 U/L    ALT (SGPT) 22 12 - 78 U/L    Alk.  phosphatase 69 45 - 117 U/L    Protein, total 7.6 6.4 - 8.2 g/dL    Albumin 3.9 3.5 - 5.0 g/dL    Globulin 3.7 2.0 - 4.0 g/dL    A-G Ratio 1.1 1.1 - 2.2     NT-PRO BNP    Collection Time: 06/30/22 12:59 PM   Result Value Ref Range    NT pro-BNP 49 <125 pg/mL   TROPONIN-HIGH SENSITIVITY    Collection Time: 06/30/22 12:59 PM   Result Value Ref Range    Troponin-High Sensitivity 14 0 - 76 ng/L   MAGNESIUM    Collection Time: 06/30/22 12:59 PM   Result Value Ref Range    Magnesium 2.2 1.6 - 2.4 mg/dL   DRUG SCREEN, URINE    Collection Time: 06/30/22 12:59 PM   Result Value Ref Range    AMPHETAMINES Negative Negative      BARBITURATES Negative Negative      BENZODIAZEPINES Negative Negative      COCAINE Negative Negative      ECSTASY, MDMA Negative Negative      METHADONE Negative Negative      OPIATES Negative Negative      PCP(PHENCYCLIDINE) Negative Negative      THC (TH-CANNABINOL) Negative Negative      Drug screen comment PH 6.5    URINALYSIS W/ RFLX MICROSCOPIC    Collection Time: 06/30/22 12:59 PM   Result Value Ref Range    Color Yellow/Straw      Appearance Clear Clear      Specific gravity 1.020 1.003 - 1.030      pH (UA) 6.5 5.0 - 8.0      Protein Negative Negative mg/dL    Glucose Negative Negative mg/dL    Ketone Negative Negative mg/dL    Bilirubin Negative Negative      Blood Trace (A) Negative Urobilinogen 0.2 0.2 - 1.0 EU/dL    Nitrites Negative Negative      Leukocyte Esterase Negative Negative     URINE MICROSCOPIC    Collection Time: 06/30/22 12:59 PM   Result Value Ref Range    WBC 0-4 0 - 5 /hpf    RBC 0-5 0 - 3 /hpf    Bacteria Negative Negative /hpf   TROPONIN-HIGH SENSITIVITY    Collection Time: 06/30/22  2:44 PM   Result Value Ref Range    Troponin-High Sensitivity 15 0 - 76 ng/L   TROPONIN-HIGH SENSITIVITY    Collection Time: 06/30/22 11:17 PM   Result Value Ref Range    Troponin-High Sensitivity 7 0 - 76 ng/L       No orders to display       Active Problems:    Chest pain (6/30/2022)      Hypertensive emergency (6/30/2022)        Assessment/Plan:     1. Atypical chest pain  -  Possibly s/t hypertensive emergency  - Troponin negative x3  - EKG no evidence of ischemia   - However given significant cardiac history, will admit for observation  - ECHO  - Consult cardiology     2. Hypertension emergency  - Nitro ggt  - Monitor overnight. Keep BP approx 160/100  - Restart home medications, increase amlodipine to 10 mg  - Adjust as needed      3. History of seizure  - Continue home medications      DVT Prophylaxis: Lovenox  GI ppx: Protonix   Code Status: Full  POA:    Care Plan discussed with: patient and nursing     Total time spent with patient: >35 minutes.

## 2022-07-02 PROBLEM — R07.89 ATYPICAL CHEST PAIN: Status: ACTIVE | Noted: 2022-07-02

## 2022-07-02 PROCEDURE — 96372 THER/PROPH/DIAG INJ SC/IM: CPT

## 2022-07-02 PROCEDURE — 74011250637 HC RX REV CODE- 250/637: Performed by: HOSPITALIST

## 2022-07-02 PROCEDURE — 74011250637 HC RX REV CODE- 250/637: Performed by: INTERNAL MEDICINE

## 2022-07-02 PROCEDURE — G0378 HOSPITAL OBSERVATION PER HR: HCPCS

## 2022-07-02 PROCEDURE — 65610000006 HC RM INTENSIVE CARE

## 2022-07-02 PROCEDURE — 74011000250 HC RX REV CODE- 250: Performed by: INTERNAL MEDICINE

## 2022-07-02 PROCEDURE — 96376 TX/PRO/DX INJ SAME DRUG ADON: CPT

## 2022-07-02 PROCEDURE — 74011250636 HC RX REV CODE- 250/636: Performed by: INTERNAL MEDICINE

## 2022-07-02 RX ORDER — CETIRIZINE HCL 10 MG
10 TABLET ORAL DAILY
Status: DISCONTINUED | OUTPATIENT
Start: 2022-07-02 | End: 2022-07-03 | Stop reason: HOSPADM

## 2022-07-02 RX ORDER — POLYETHYLENE GLYCOL 3350 17 G/17G
17 POWDER, FOR SOLUTION ORAL DAILY
Status: DISCONTINUED | OUTPATIENT
Start: 2022-07-03 | End: 2022-07-03 | Stop reason: HOSPADM

## 2022-07-02 RX ADMIN — CETIRIZINE HYDROCHLORIDE 10 MG: 10 TABLET, FILM COATED ORAL at 18:58

## 2022-07-02 RX ADMIN — SODIUM CHLORIDE, PRESERVATIVE FREE 10 ML: 5 INJECTION INTRAVENOUS at 17:21

## 2022-07-02 RX ADMIN — SODIUM CHLORIDE 10 MG/HR: 9 INJECTION, SOLUTION INTRAVENOUS at 07:06

## 2022-07-02 RX ADMIN — ENOXAPARIN SODIUM 40 MG: 100 INJECTION SUBCUTANEOUS at 08:57

## 2022-07-02 RX ADMIN — ACETAMINOPHEN 650 MG: 325 TABLET ORAL at 21:14

## 2022-07-02 RX ADMIN — CLONIDINE HYDROCHLORIDE 0.2 MG: 0.1 TABLET ORAL at 21:15

## 2022-07-02 RX ADMIN — SODIUM CHLORIDE 8 MG/HR: 9 INJECTION, SOLUTION INTRAVENOUS at 01:45

## 2022-07-02 RX ADMIN — SODIUM CHLORIDE 5 MG/HR: 9 INJECTION, SOLUTION INTRAVENOUS at 09:19

## 2022-07-02 RX ADMIN — CLONIDINE HYDROCHLORIDE 0.2 MG: 0.1 TABLET ORAL at 08:54

## 2022-07-02 RX ADMIN — SODIUM CHLORIDE, PRESERVATIVE FREE 10 ML: 5 INJECTION INTRAVENOUS at 21:18

## 2022-07-02 RX ADMIN — ATORVASTATIN CALCIUM 10 MG: 10 TABLET, FILM COATED ORAL at 21:15

## 2022-07-02 RX ADMIN — LOSARTAN POTASSIUM 50 MG: 50 TABLET, FILM COATED ORAL at 08:54

## 2022-07-02 RX ADMIN — CARVEDILOL 6.25 MG: 3.12 TABLET, FILM COATED ORAL at 17:18

## 2022-07-02 RX ADMIN — AMLODIPINE BESYLATE 10 MG: 5 TABLET ORAL at 08:54

## 2022-07-02 RX ADMIN — LEVETIRACETAM 750 MG: 250 TABLET, FILM COATED ORAL at 21:15

## 2022-07-02 RX ADMIN — PANTOPRAZOLE SODIUM 40 MG: 40 TABLET, DELAYED RELEASE ORAL at 08:55

## 2022-07-02 RX ADMIN — SODIUM CHLORIDE, PRESERVATIVE FREE 10 ML: 5 INJECTION INTRAVENOUS at 08:57

## 2022-07-02 RX ADMIN — SODIUM CHLORIDE 7.5 MG/HR: 9 INJECTION, SOLUTION INTRAVENOUS at 07:05

## 2022-07-02 RX ADMIN — SODIUM CHLORIDE 5 MG/HR: 9 INJECTION, SOLUTION INTRAVENOUS at 06:52

## 2022-07-02 RX ADMIN — CARVEDILOL 6.25 MG: 3.12 TABLET, FILM COATED ORAL at 08:55

## 2022-07-02 NOTE — PROGRESS NOTES
Hospitalist Progress Note    Subjective:   Daily Progress Note: 7/2/2022 10:53 AM    Hospital Course:  Kristin Carvajal is a 51-year-old male with PMHx of CAD s/p PCI, CHF s/p defibrillator , hypertension and HLP who presented to the ED with chief complaint of of chest pain started this morning. State pain started from lower abdomen, radiates to chest then neck. Burning and tight in nature. 10/10 at maximum. Not related to dyspnea, nausea or diaphoresis. Trial of SL nitro, but did not improve symptoms and gave him headache. In the ED, noted elevated BP and started on nicardipine gtt. Patient currently reports only chest tightness, 5/10. CBC and CMP unremarkable. Troponin negative x3. EKG showing no signs of ischemia. Chest x-ray negative for acute pulmonary infiltrates. Cardiology consult. Patient will require pacemaker interrogation. Subjective:    Patient seen and examined at bedside. Reports some improvement in chest tightness.      Current Facility-Administered Medications   Medication Dose Route Frequency    [START ON 7/3/2022] polyethylene glycol (MIRALAX) packet 17 g  17 g Oral DAILY    aspirin chewable tablet 81 mg  81 mg Oral DAILY    cloNIDine HCL (CATAPRES) tablet 0.2 mg  0.2 mg Oral BID    levETIRAcetam (KEPPRA) tablet 750 mg  750 mg Oral BID    pantoprazole (PROTONIX) tablet 40 mg  40 mg Oral ACB    atorvastatin (LIPITOR) tablet 10 mg  10 mg Oral QHS    tamsulosin (FLOMAX) capsule 0.4 mg  0.4 mg Oral DAILY    sodium chloride (NS) flush 5-40 mL  5-40 mL IntraVENous Q8H    sodium chloride (NS) flush 5-40 mL  5-40 mL IntraVENous PRN    acetaminophen (TYLENOL) tablet 650 mg  650 mg Oral Q6H PRN    Or    acetaminophen (TYLENOL) suppository 650 mg  650 mg Rectal Q6H PRN    polyethylene glycol (MIRALAX) packet 17 g  17 g Oral DAILY PRN    ondansetron (ZOFRAN ODT) tablet 4 mg  4 mg Oral Q8H PRN    Or    ondansetron (ZOFRAN) injection 4 mg  4 mg IntraVENous Q6H PRN    enoxaparin (LOVENOX) injection 40 mg  40 mg SubCUTAneous DAILY    amLODIPine (NORVASC) tablet 10 mg  10 mg Oral DAILY    niCARdipine (CARDENE) 25 mg in 0.9% sodium chloride 250 mL ([id number])  5-15 mg/hr IntraVENous TITRATE    losartan (COZAAR) tablet 50 mg  50 mg Oral DAILY    carvediloL (COREG) tablet 6.25 mg  6.25 mg Oral BID WITH MEALS    HYDROmorphone (DILAUDID) syringe 0.5 mg  0.5 mg IntraVENous Q4H PRN    butalbital-acetaminophen-caffeine (FIORICET, ESGIC) -40 mg per tablet 1 Tablet  1 Tablet Oral Q4H PRN        Review of Systems  Constitutional: No fevers, No chills, No sweats, No fatigue, No Weakness  Eyes: No redness  Ears, nose, mouth, throat, and face: No nasal congestion, No sore throat, No voice change  Respiratory: No Shortness of Breath, No cough, No wheezing  Cardiovascular: + chest pain, No palpitations, No extremity edema  Gastrointestinal: No nausea, No vomiting, No diarrhea, No abdominal pain  Genitourinary: No frequency, No dysuria, No hematuria  Integument/breast: No skin lesion(s)   Neurological: No Confusion, No headaches, No dizziness      Objective:     Visit Vitals  /74   Pulse 63   Temp 97.8 °F (36.6 °C)   Resp 17   Ht 6' (1.829 m)   Wt 88.5 kg (195 lb 1.7 oz)   SpO2 98%   BMI 26.46 kg/m²      O2 Device: None (Room air)    Temp (24hrs), Av.9 °F (36.6 °C), Min:97.8 °F (36.6 °C), Max:97.9 °F (36.6 °C)      No intake/output data recorded.  1901 -  0700  In: 733.8 [I.V.:733.8]  Out: -     PHYSICAL EXAM:  Constitutional: No acute distress  Skin: Extremities and face reveal no rashes. HEENT: Sclerae anicteric. Extra-occular muscles are intact. No oral ulcers. The neck is supple and no masses. Cardiovascular: Regular rate and rhythm. +S1/S2. No murmur or gallop. No JVD. Respiratory:  Clear breath sounds bilaterally with no wheezes, rales, or rhonchi. GI: Abdomen nondistended, soft, and nontender. Normal active bowel sounds.   Rectal: Deferred   Musculoskeletal: No pitting edema of the lower legs. Able to move all ext  Neurological:  Patient is alert and oriented x3. Cranial nerves II-XII grossly intact  Psychiatric: Mood appears appropriate       Data Review    Recent Results (from the past 24 hour(s))   MRSA SCREEN - PCR (NASAL)    Collection Time: 07/01/22  5:00 PM   Result Value Ref Range    MRSA by PCR, Nasal Not Detected Not Detected         No orders to display       Active Problems:    Chest pain (6/30/2022)      Hypertensive emergency (6/30/2022)      Atypical chest pain (7/2/2022)        Assessment/Plan:     1. Atypical chest pain  -  Possibly s/t hypertensive emergency, we will check manual BP   - Troponin negative x3  - EKG no evidence of ischemia   - However given significant cardiac history, will admit for observation  - ECHO  - Consult cardiology     2. Hypertension emergency  - Nitro ggt  - Monitor overnight. Keep BP approx 160/100  - Restart home medications, increase amlodipine to 10 mg  - Adjust as needed      3. History of seizure  - Continue home medications      DVT Prophylaxis: Lovenox  GI ppx: Protonix   Code Status: Full  POA:    DISPO:  blood pressure is normal manually we will switch it to p.o. medication and hypertension [facility] to [facility] either later today or tomorrow    Care Plan discussed with: patient and nursing     Total time spent with patient: >35 minutes.

## 2022-07-02 NOTE — PROGRESS NOTES
Client declined his Keppra and Flomax reporting that he does not take any longer. Client declined his Aspirin due to NSAID allergy and reported that he usually takes Plavix daily.

## 2022-07-02 NOTE — PROGRESS NOTES
Progress Note      7/2/2022 9:55 AM  NAME: Eva Heredia   MRN:  [id number]   Admit Diagnosis: Chest pain [R07.9]  Hypertensive emergency [I16.1]  Atypical chest pain [R07.89]      Problem List:   -Admitted to the hospital with chest pain  -Variable blood pressure  -History of sick sinus syndrome status post dual-chamber pacemaker.  -Bipolar disorder  -Dyslipidemia  -History of hypertension  -Normal ejection fraction of 65% with mild LVH per echocardiogram dated 7/01/2021     Assessment/Plan:   -61-year-old inmate from [address] [place] [address] admitted to the hospital with chest pain.  -Patient is lying comfortably in the bed and has no complaint this morning.  -Cardiac enzyme has been unremarkable and EKG shows no acute changes. Monitor shows occasional pacemaker spikes with atrial pacing.  -We will interrogate the pacemaker.  -We will check blood pressure which was normal 138/60 morning but monitor is not measuring his blood pressure correctly based on my current clinical assessment. We will check manual blood pressure.  -Once we establish normal blood pressure he can be discharged to follow-up with me in the correctional facility on site.  -No further cardiovascular testing based on my overall cardiac assessment and if his blood pressure is normal manually we will switch it to p.o. medication and hypertension discharge back to correctional facility either later today or tomorrow. []       High complexity decision making was performed in this patient at high risk for decompensation with multiple organ involvement. Subjective:     Eva Heredia is a 61-year-old -American inmate from [place] [address] with history of sick sinus syndrome status post permanent pacemaker insertion earlier this year in February 2022 admitted to the hospital with chest pain. Cardiac enzyme has been unremarkable and EKG shows no acute changes.   Other than variable blood pressure readings patient is clinically and hemodynamically stable. Monitor shows a paced maker spikes consistent with normally functioning pacemaker. We will interrogate the pacemaker and if his pacemaker is functioning normally and blood pressure is under control he can be discharged today or tomorrow back to correctional facility with resumption  patients all the current medications. Discussed with RN events overnight. Review of Systems:    Symptom Y/N Comments  Symptom Y/N Comments   Fever/Chills N   Chest Pain N    Poor Appetite N   Edema N    Cough N   Abdominal Pain N    Sputum N   Joint Pain N    SOB/BROWN N   Pruritis/Rash N    Nausea/vomit N   Tolerating PT/OT Y    Diarrhea N   Tolerating Diet Y    Constipation N   Other       Could NOT obtain due to:      Objective:      Physical Exam:    Last 24hrs VS reviewed since prior progress note. Most recent are:    Visit Vitals  BP (!) 132/92   Pulse 61   Temp 97.8 °F (36.6 °C)   Resp 12   Ht 6' (1.829 m)   Wt 88.5 kg (195 lb 1.7 oz)   SpO2 98%   BMI 26.46 kg/m²       Intake/Output Summary (Last 24 hours) at 7/2/2022 0955  Last data filed at 7/1/2022 1848  Gross per 24 hour   Intake 733.75 ml   Output --   Net 733.75 ml        General Appearance: Well developed, well nourished, alert & oriented x 3,    no acute distress. Ears/Nose/Mouth/Throat: Hearing grossly normal.  Neck: Supple. Chest: Lungs clear to auscultation bilaterally. Cardiovascular: Regular rate and rhythm, S1S2 normal, no murmur. Abdomen: Soft, non-tender, bowel sounds are active. Extremities: No edema bilaterally. Skin: Warm and dry. []         Post-cath site without hematoma, bruit, tenderness, or thrill. Distal pulses intact.     PMH/SH reviewed - no change compared to H&P    Data Review    Telemetry: normal sinus rhythm     EKG:   []  No new EKG for review  No orders to display        Lab Data Personally Reviewed:    Recent Labs     06/30/22  1259   WBC 6.4   HGB 13.8   HCT 43.5        Recent Labs     06/30/22  1259   INR 1.0   PTP 13.2      Recent Labs     06/30/22  1259      K 3.8      CO2 30   BUN 9   CREA 1.15   GLU 86   CA 9.1   MG 2.2     No results for input(s): CPK, CKNDX, TROIQ in the last 72 hours. No lab exists for component: CPKMB  Lab Results   Component Value Date/Time    Cholesterol, total 104 (L) 01/10/2016 02:24 AM    HDL Cholesterol 28 (L) 01/10/2016 02:24 AM    LDL, calculated 47 01/10/2016 02:24 AM    Triglyceride 145 01/10/2016 02:24 AM    CHOL/HDL Ratio 3.7 01/10/2016 02:24 AM       Recent Labs     06/30/22  1259   AP 69   TP 7.6   ALB 3.9   GLOB 3.7     No results for input(s): PH, PCO2, PO2 in the last 72 hours.     Medications Personally Reviewed:    Current Facility-Administered Medications   Medication Dose Route Frequency    aspirin chewable tablet 81 mg  81 mg Oral DAILY    cloNIDine HCL (CATAPRES) tablet 0.2 mg  0.2 mg Oral BID    levETIRAcetam (KEPPRA) tablet 750 mg  750 mg Oral BID    pantoprazole (PROTONIX) tablet 40 mg  40 mg Oral ACB    atorvastatin (LIPITOR) tablet 10 mg  10 mg Oral QHS    tamsulosin (FLOMAX) capsule 0.4 mg  0.4 mg Oral DAILY    sodium chloride (NS) flush 5-40 mL  5-40 mL IntraVENous Q8H    sodium chloride (NS) flush 5-40 mL  5-40 mL IntraVENous PRN    acetaminophen (TYLENOL) tablet 650 mg  650 mg Oral Q6H PRN    Or    acetaminophen (TYLENOL) suppository 650 mg  650 mg Rectal Q6H PRN    polyethylene glycol (MIRALAX) packet 17 g  17 g Oral DAILY PRN    ondansetron (ZOFRAN ODT) tablet 4 mg  4 mg Oral Q8H PRN    Or    ondansetron (ZOFRAN) injection 4 mg  4 mg IntraVENous Q6H PRN    enoxaparin (LOVENOX) injection 40 mg  40 mg SubCUTAneous DAILY    amLODIPine (NORVASC) tablet 10 mg  10 mg Oral DAILY    niCARdipine (CARDENE) 25 mg in 0.9% sodium chloride 250 mL ([id number])  5-15 mg/hr IntraVENous TITRATE    losartan (COZAAR) tablet 50 mg  50 mg Oral DAILY    carvediloL (COREG) tablet 6.25 mg  6.25 mg Oral BID WITH MEALS    HYDROmorphone (DILAUDID) syringe 0.5 mg  0.5 mg IntraVENous Q4H PRN    butalbital-acetaminophen-caffeine (FIORICET, ESGIC) -40 mg per tablet 1 Tablet  1 Tablet Oral Q4H PRN         Aimee Lugo MD

## 2022-07-02 NOTE — PROGRESS NOTES
Client given PO meds this morning, Cardene gtt off and BP increased to 190/140's, but when rechecked came down to 180's/80's just prior to taking PO medications. Will leave Cardene gtt off for now and monitor BP closely; client reports he can tell when his blood pressure elevates as he reports blurred vision and \"stuffy nose. \" No symtpoms at this time, but will continue to monitor closely.

## 2022-07-02 NOTE — PROGRESS NOTES
Pacer interrogation requested by Dr. Chelo Saucedo for [employer] device. Notified Dr. Chelo Saucedo at desk that client declined aspirin this morning due NSAID allergy and reported that he took Plavix daily instead of aspirin.

## 2022-07-02 NOTE — PROGRESS NOTES
Dr. Pernell Rivera returned call, notified [employer] report received, discussed. Will leave report on chart for tomorrow's review.

## 2022-07-02 NOTE — PROGRESS NOTES
Client reports having a [employer] ICD in left chest wall, placed at [facility] February 17th.

## 2022-07-02 NOTE — PROGRESS NOTES
Zyrtec given for \"stuffy nose\" per patient. No complaints or distress noted, client able to sleep well, asymptomatic, HR stable. Client denies chest pain, shortness of breath, or neuro changes with feeling \"blood pressure up. \"

## 2022-07-02 NOTE — PROGRESS NOTES
Client BP elevated, asked about if I would restart the Cardene gtt for his BP. Discussed that I spoke with Dr. Aliza Zayas earlier with rounds, and he would not to restart it if not needed. Scheduled PO meds given, and dietary brought tray into room. Client uncovered his left arm, as right arm was already out of blankets and veins were distended on forearm and his shoulder was stiff. He stated, \"It is stiff from holding it straight for so long. \" Ten minutes after PO Coreg, BP down to 218/98. Client asymptomatic, no reported vision changes, headache, nausea, or vomiting; Will continue to monitor.

## 2022-07-03 VITALS
WEIGHT: 195.11 LBS | BODY MASS INDEX: 26.43 KG/M2 | RESPIRATION RATE: 15 BRPM | OXYGEN SATURATION: 97 % | TEMPERATURE: 98.2 F | HEIGHT: 72 IN | SYSTOLIC BLOOD PRESSURE: 102 MMHG | DIASTOLIC BLOOD PRESSURE: 76 MMHG | HEART RATE: 65 BPM

## 2022-07-03 LAB — COVID-19 RAPID TEST, COVR: NOT DETECTED

## 2022-07-03 PROCEDURE — 74011000250 HC RX REV CODE- 250: Performed by: INTERNAL MEDICINE

## 2022-07-03 PROCEDURE — 74011250636 HC RX REV CODE- 250/636: Performed by: INTERNAL MEDICINE

## 2022-07-03 PROCEDURE — 74011250637 HC RX REV CODE- 250/637: Performed by: HOSPITALIST

## 2022-07-03 PROCEDURE — 74011250637 HC RX REV CODE- 250/637: Performed by: INTERNAL MEDICINE

## 2022-07-03 PROCEDURE — 87635 SARS-COV-2 COVID-19 AMP PRB: CPT

## 2022-07-03 RX ORDER — OMEPRAZOLE 40 MG/1
40 CAPSULE, DELAYED RELEASE ORAL DAILY
Qty: 30 CAPSULE | Refills: 2 | Status: SHIPPED | OUTPATIENT
Start: 2022-07-03

## 2022-07-03 RX ORDER — AMLODIPINE BESYLATE 10 MG/1
10 TABLET ORAL DAILY
Qty: 30 TABLET | Refills: 3 | Status: SHIPPED | OUTPATIENT
Start: 2022-07-03

## 2022-07-03 RX ORDER — LEVETIRACETAM 750 MG/1
750 TABLET ORAL 2 TIMES DAILY
Qty: 30 TABLET | Refills: 2 | Status: SHIPPED | OUTPATIENT
Start: 2022-07-03

## 2022-07-03 RX ADMIN — LEVETIRACETAM 750 MG: 250 TABLET, FILM COATED ORAL at 09:58

## 2022-07-03 RX ADMIN — PANTOPRAZOLE SODIUM 40 MG: 40 TABLET, DELAYED RELEASE ORAL at 09:58

## 2022-07-03 RX ADMIN — SODIUM CHLORIDE, PRESERVATIVE FREE 10 ML: 5 INJECTION INTRAVENOUS at 05:33

## 2022-07-03 RX ADMIN — CETIRIZINE HYDROCHLORIDE 10 MG: 10 TABLET, FILM COATED ORAL at 09:58

## 2022-07-03 RX ADMIN — ACETAMINOPHEN 650 MG: 325 TABLET ORAL at 09:58

## 2022-07-03 RX ADMIN — ENOXAPARIN SODIUM 40 MG: 100 INJECTION SUBCUTANEOUS at 09:58

## 2022-07-03 NOTE — DISCHARGE SUMMARY
Physician Discharge Summary     Patient ID:    Ezekiel Love  [id number]  85 y.o.  1970    Admit date: 6/30/2022    Discharge date : 7/3/2022    Chronic Diagnoses:    Problem List as of 7/3/2022 Never Reviewed          Codes Class Noted - Resolved    Atypical chest pain ICD-10-CM: R07.89  ICD-9-CM: 786.59  7/2/2022 - Present        Chest pain ICD-10-CM: R07.9  ICD-9-CM: 786.50  6/30/2022 - Present        Hypertensive emergency ICD-10-CM: I16.1  ICD-9-CM: 401.9  6/30/2022 - Present        Drug overdose, intentional ([facility] [address] ICD-10-CM: I08.184I  ICD-9-CM: 977.9, E980.5  5/1/2016 - Present        Alteration consciousness ICD-10-CM: R40.4  ICD-9-CM: 780.09  5/1/2016 - Present          22    Final Diagnoses:   Chest pain [R07.9]  Hypertensive emergency [I16.1]  Atypical chest pain [R07.89]    Reason for Hospitalization:    HTN emergency  Seizure  Chest pain      Hospital Course:     Bobby Madsen a 50year-old male with PMHx of CAD s/p PCI, CHF s/p defibrillator , hypertension and HLP who presented to the ED with chief complaint of of chest pain started this morning. State pain started from lower abdomen, radiates to chest then neck. Burning and tight in nature. 10/10 at maximum. Not related to dyspnea, nausea or diaphoresis. Trial of SL nitro, but did not improve symptoms and gave him headache.    In the ED, noted elevated BP and started on nicardipine gtt. Patient currently reports only chest tightness, 5/10. CBC and CMP unremarkable. Troponin negative x3. EKG showing no signs of ischemia. Chest x-ray negative for acute pulmonary infiltrates. Cardiology consult.  Patient will require pacemaker interrogation.        Seen by cardiology, medications adjusted and planned for discharge      INSTRUCTIONS ON DISCHARGE:    The only two BP medication added to your regimen are:                 AMLODIPINE 10 mg daily                  COREG 6.25 twice a day    Please STOP: LOSARTAN, NIFEDIPINE and CLONIDINE    Please follow-up with Dr. Annelise Metz in 2 weeks          Discharge Medications:   Current Discharge Medication List      CONTINUE these medications which have CHANGED    Details   amLODIPine (Norvasc) 10 mg tablet Take 1 Tablet by mouth daily. Qty: 30 Tablet, Refills: 3  Start date: 7/3/2022      levETIRAcetam (KEPPRA) 750 mg tablet Take 1 Tablet by mouth two (2) times a day. Qty: 30 Tablet, Refills: 2  Start date: 7/3/2022      omeprazole (PRILOSEC) 40 mg capsule Take 1 Capsule by mouth daily. Qty: 30 Capsule, Refills: 2  Start date: 7/3/2022         CONTINUE these medications which have NOT CHANGED    Details   albuterol (PROVENTIL VENTOLIN) 2.5 mg /3 mL (0.083 %) nebu 2.5 mg by Nebulization route two (2) times daily as needed for Wheezing or Shortness of Breath. latanoprost (XALATAN) 0.005 % ophthalmic solution Administer 1 Drop to both eyes nightly. albuterol (PROVENTIL HFA, VENTOLIN HFA, PROAIR HFA) 90 mcg/actuation inhaler Take 2 Puffs by inhalation two (2) times daily as needed for Wheezing. atorvastatin (LIPITOR) 40 mg tablet Take 40 mg by mouth daily. carvediloL (COREG) 6.25 mg tablet Take 6.25 mg by mouth two (2) times a day. clopidogreL (PLAVIX) 75 mg tab Take 75 mg by mouth daily. lanolin alcohol-mineral oil-petrolatum (Hydrocerin, with petrolatum,) topical cream Apply  to affected area as needed for Dry Skin. APPLY TWICE DAILY      sodium chloride (Deep Sea Nasal) 0.65 % nasal squeeze bottle 1 [place] by Both Nostrils route four (4) times daily. busPIRone (BUSPAR) 30 mg tablet Take 30 mg by mouth two (2) times a day. multivitamin (ONE A DAY) tablet Take 1 Tablet by mouth daily. fluticasone propion-salmeteroL (AirDuo RespiClick) 677-20 mcg/actuation aepb Take 1 Puff by inhalation two (2) times a day. nitroglycerin (NITROSTAT) 0.4 mg SL tablet 0.4 mg by SubLINGual route every five (5) minutes as needed for Chest Pain.          STOP taking these medications       NIFEdipine ER (PROCARDIA XL) 90 mg ER tablet Comments:   Reason for Stopping:         cloNIDine HCl (CATAPRES) 0.2 mg tablet Comments:   Reason for Stopping:         aspirin 81 mg chewable tablet Comments:   Reason for Stopping:         simvastatin (ZOCOR) 20 mg tablet Comments:   Reason for Stopping:         tamsulosin (FLOMAX) 0.4 mg capsule Comments:   Reason for Stopping: Follow up Care:    1. Unknown, Provider, PA in 1-2 weeks. Please call to set up an appointment shortly after discharge. Diet: cardiac    Disposition:  [address] [address] enforcement    Advanced Directive:   FULL x   DNR      Discharge Exam:  PHYSICAL EXAM:  Constitutional: No acute distress  Skin: Extremities and face reveal no rashes. HEENT: Sclerae anicteric. Extra-occular muscles are intact. No oral ulcers. The neck is supple and no masses. Cardiovascular: Regular rate and rhythm. +S1/S2. No murmur or gallop. No JVD. Respiratory:  Clear breath sounds bilaterally with no wheezes, rales, or rhonchi. GI: Abdomen nondistended, soft, and nontender. Normal active bowel sounds. Rectal: Deferred   Musculoskeletal: No pitting edema of the lower legs. Able to move all ext  Neurological:  Patient is alert and oriented x3. Cranial nerves II-XII grossly intact  Psychiatric: Mood appears appropriate        CONSULTATIONS:none    Significant Diagnostic Studies:     Radiologic Studies -   Results from Hospital Encounter encounter on 06/30/22    XR CHEST PORT    Narrative  1 view    Left subclavian pacemaker    Impression  The cardiomediastinal silhouette is appropriate for age, technique,  and lung expansion. Pulmonary vasculature is not congested. The lungs are  essentially clear. No effusion or pneumothorax is seen.      CT Results  (Last 48 hours)    None              Discharge time spent 35 minutes    Signed:  Chucky Hunter MD  7/3/2022  11:57 AM

## 2022-07-03 NOTE — PROGRESS NOTES
Lab stated the rapid COVID results are negative as preliminary. Lab stated they would go through \"[employer]\" and post the results in Epic.

## 2022-07-03 NOTE — PROGRESS NOTES
, Demarco Sesay, called and notified of discharge plan per charge RN. Awaiting call back for any additional discharge actions required.

## 2022-07-03 NOTE — PROGRESS NOTES
Progress Note      7/3/2022 9:55 AM  NAME: Zoila Lane   MRN:  [id number]   Admit Diagnosis: Chest pain [R07.9]  Hypertensive emergency [I16.1]  Atypical chest pain [R07.89]      Problem List:   -Admitted to the hospital with chest pain  -Variable blood pressure  -History of sick sinus syndrome status post dual-chamber pacemaker.  -Bipolar disorder  -Dyslipidemia  -History of hypertension  -Normal ejection fraction of 65% with mild LVH per echocardiogram dated 7/01/2021     Assessment/Plan:   Note dictated 7/3/2022  -Patient is lying comfortably in the bed and has no symptoms. Blood pressure is running in low 100 108/68 mmHg while I was in the room. -He was admitted to the hospital with chest pain. He has history of sick sinus syndrome status post permanent pacemaker.  -No further cardiovascular testing at this time and we will discharge him today to follow-up with myself in [place] [address] on 7/12/2022 and schedule him for a stress test.  -For now I will discontinue losartan and clonidine. Continue low-dose carvedilol 6.25 mg twice a day and Norvasc 10 mg daily. I will see him in external facility dated 7/12/2022 and adjust his medications.  -Per my cardiac assessment he can be discharged today.  -29-year-old inmate from [place] [address] admitted to the hospital with chest pain.  -Patient is lying comfortably in the bed and has no complaint this morning.  -Cardiac enzyme has been unremarkable and EKG shows no acute changes. Monitor shows occasional pacemaker spikes with atrial pacing.  -We will interrogate the pacemaker.  -We will check blood pressure which was normal 138/60 morning but monitor is not measuring his blood pressure correctly based on my current clinical assessment.   We will check manual blood pressure.  -Once we establish normal blood pressure he can be discharged to follow-up with me in the correctional facility on site.  -No further cardiovascular testing based on my overall cardiac assessment and if his blood pressure is normal manually we will switch it to p.o. medication and hypertension discharge back to correctional facility either later today or tomorrow. []       High complexity decision making was performed in this patient at high risk for decompensation with multiple organ involvement. Subjective:     Gurjit Johnson is a 63-year-old -American inmate from [place] [address] with history of sick sinus syndrome status post permanent pacemaker insertion earlier this year in February 2022 admitted to the hospital with chest pain. Cardiac enzyme has been unremarkable and EKG shows no acute changes. Other than variable blood pressure readings patient is clinically and hemodynamically stable. Monitor shows a paced maker spikes consistent with normally functioning pacemaker. We will interrogate the pacemaker and if his pacemaker is functioning normally and blood pressure is under control he can be discharged today or tomorrow back to correctional facility with resumption  patients all the current medications. Discussed with RN events overnight. Review of Systems:    Symptom Y/N Comments  Symptom Y/N Comments   Fever/Chills N   Chest Pain N    Poor Appetite N   Edema N    Cough N   Abdominal Pain N    Sputum N   Joint Pain N    SOB/BROWN N   Pruritis/Rash N    Nausea/vomit N   Tolerating PT/OT Y    Diarrhea N   Tolerating Diet Y    Constipation N   Other       Could NOT obtain due to:      Objective:      Physical Exam:    Last 24hrs VS reviewed since prior progress note.  Most recent are:    Visit Vitals  /72 Comment: Manual check   Pulse 62   Temp 98 °F (36.7 °C)   Resp 15   Ht 6' (1.829 m)   Wt 88.5 kg (195 lb 1.7 oz)   SpO2 97%   BMI 26.46 kg/m²       Intake/Output Summary (Last 24 hours) at 7/3/2022 1110  Last data filed at 7/2/2022 2200  Gross per 24 hour   Intake 1030 ml   Output 1050 ml   Net -20 ml General Appearance: Well developed, well nourished, alert & oriented x 3,    no acute distress. Ears/Nose/Mouth/Throat: Hearing grossly normal.  Neck: Supple. Chest: Lungs clear to auscultation bilaterally. Cardiovascular: Regular rate and rhythm, S1S2 normal, no murmur. Abdomen: Soft, non-tender, bowel sounds are active. Extremities: No edema bilaterally. Skin: Warm and dry. []         Post-cath site without hematoma, bruit, tenderness, or thrill. Distal pulses intact. PMH/SH reviewed - no change compared to H&P    Data Review    Telemetry: normal sinus rhythm     EKG:   []  No new EKG for review  No orders to display        Lab Data Personally Reviewed:    Recent Labs     06/30/22  1259   WBC 6.4   HGB 13.8   HCT 43.5        Recent Labs     06/30/22  1259   INR 1.0   PTP 13.2      Recent Labs     06/30/22  1259      K 3.8      CO2 30   BUN 9   CREA 1.15   GLU 86   CA 9.1   MG 2.2     No results for input(s): CPK, CKNDX, TROIQ in the last 72 hours. No lab exists for component: CPKMB  Lab Results   Component Value Date/Time    Cholesterol, total 104 (L) 01/10/2016 02:24 AM    HDL Cholesterol 28 (L) 01/10/2016 02:24 AM    LDL, calculated 47 01/10/2016 02:24 AM    Triglyceride 145 01/10/2016 02:24 AM    CHOL/HDL Ratio 3.7 01/10/2016 02:24 AM       Recent Labs     06/30/22  1259   AP 69   TP 7.6   ALB 3.9   GLOB 3.7     No results for input(s): PH, PCO2, PO2 in the last 72 hours.     Medications Personally Reviewed:    Current Facility-Administered Medications   Medication Dose Route Frequency    polyethylene glycol (MIRALAX) packet 17 g  17 g Oral DAILY    cetirizine (ZYRTEC) tablet 10 mg  10 mg Oral DAILY    aspirin chewable tablet 81 mg  81 mg Oral DAILY    levETIRAcetam (KEPPRA) tablet 750 mg  750 mg Oral BID    pantoprazole (PROTONIX) tablet 40 mg  40 mg Oral ACB    atorvastatin (LIPITOR) tablet 10 mg  10 mg Oral QHS    tamsulosin (FLOMAX) capsule 0.4 mg  0.4 mg Oral DAILY    sodium chloride (NS) flush 5-40 mL  5-40 mL IntraVENous Q8H    sodium chloride (NS) flush 5-40 mL  5-40 mL IntraVENous PRN    acetaminophen (TYLENOL) tablet 650 mg  650 mg Oral Q6H PRN    Or    acetaminophen (TYLENOL) suppository 650 mg  650 mg Rectal Q6H PRN    polyethylene glycol (MIRALAX) packet 17 g  17 g Oral DAILY PRN    ondansetron (ZOFRAN ODT) tablet 4 mg  4 mg Oral Q8H PRN    Or    ondansetron (ZOFRAN) injection 4 mg  4 mg IntraVENous Q6H PRN    enoxaparin (LOVENOX) injection 40 mg  40 mg SubCUTAneous DAILY    amLODIPine (NORVASC) tablet 10 mg  10 mg Oral DAILY    niCARdipine (CARDENE) 25 mg in 0.9% sodium chloride 250 mL ([id number])  5-15 mg/hr IntraVENous TITRATE    carvediloL (COREG) tablet 6.25 mg  6.25 mg Oral BID WITH MEALS    HYDROmorphone (DILAUDID) syringe 0.5 mg  0.5 mg IntraVENous Q4H PRN    butalbital-acetaminophen-caffeine (FIORICET, ESGIC) -40 mg per tablet 1 Tablet  1 Tablet Oral Q4H PRN         Reese Lopez MD

## 2022-07-03 NOTE — DISCHARGE INSTRUCTIONS
Patient Education        Home Blood Pressure Test: About This Test  What is it? A home blood pressure test allows you to keep track of your blood pressure at home. Blood pressure is a measure of the force of blood against the walls of your arteries. Blood pressure readings include two numbers, such as 130/80 (say \"130 over 80\"). The first number is the systolic pressure. The second number is the diastolic pressure. Why is this test done? You may do this test at home to:  · Find out if you have high blood pressure. · Track your blood pressure if you have high blood pressure. · Track how well medicine is working to reduce high blood pressure. · Check how lifestyle changes, such as weight loss and exercise, are affecting blood pressure. How do you prepare for the test?  For at least 30 minutes before you take your blood pressure, don't exercise, drink caffeine, or smoke. Empty your bladder before the test. Sit quietly with your back straight and both feet on the floor for at least 5 minutes. This helps you take your blood pressure while you feel comfortable and relaxed. How is the test done? · If your doctor recommends it, take your blood pressure twice a day. Take it in the morning and evening. · Sit with your arm slightly bent and resting on a table so that your upper arm is at the same level as your heart. · Use the same arm each time you take your blood pressure. · Place the blood pressure cuff on the bare skin of your upper arm. You may have to roll up your sleeve, remove your arm from the sleeve, or take your shirt off. · Wrap the blood pressure cuff around your upper arm so that the lower edge of the cuff is about 1 inch above the bend of your elbow. · Do not move, talk, or text while you take your blood pressure. Follow the instructions that came with your blood pressure monitor. They might be different from the following. · Press the on/off button on the automatic monitor.  Then you may need to wait until the screen says the monitor is ready. · Press the start button. The cuff will inflate and deflate by itself. · Your blood pressure numbers will appear on the screen. · Wait one minute and take your blood pressure again. · If your monitor does not automatically save your numbers, write them in your log book, along with the date and time. Follow-up care is a key part of your treatment and safety. Be sure to make and go to all appointments, and call your doctor if you are having problems. It's also a good idea to keep a list of the medicines you take. Where can you learn more? Go to http://www.[employer].com/  Enter C427 in the search box to learn more about \"Home Blood Pressure Test: About This Test.\"  Current as of: January 10, 2022               Content Version: 13.2  © 4333-3031 [employer]. Care instructions adapted under license by [employer] (which disclaims liability or warranty for this information). If you have questions about a medical condition or this instruction, always ask your healthcare professional. [place] [address] any warranty or liability for your use of this information.        INSTRUCTIONS ON DISCHARGE:    The only two BP medication added to your regimen are:                 AMLODIPINE 10 mg daily                  COREG 6.25 twice a day    Please STOP: LOSARTAN, NIFEDIPINE and CLONIDINE    Please follow-up with Dr. Geetha Chowdary in 2 weeks

## 2022-07-03 NOTE — PROGRESS NOTES
Bedside and Verbal shift change report given to Antone Homans RN (oncoming nurse) by Isa Farooq RN (offgoing nurse). Report included the following information SBAR, Kardex, Procedure Summary, Intake/Output, Recent Results and Dual Neuro Assessment, cardiac rhythm.

## 2022-07-03 NOTE — PROGRESS NOTES
Spoke with [email or web address] [phone] regarding the pts return to [facility]. She stated that a rapid covid will be required to result prior to transport. Report may be called to [phone] or [phone]. Please include pts ADL status for them to determine what kind of transport they will need to provide. Requested to be included with the pts DC paperwork  1. DC summary  2. Covid results  3. Cardiology notes  4. Prescriptions for 2 new blood pressure meds as listed in DC summary-  Amlodipine 10mg daily and Coreg 6.25 bid    Requested further that all of the documentation be placed in an envelope marked with the pts Name and   ATTENTION: MEDICAL in large letters.

## 2022-07-03 NOTE — PROGRESS NOTES
Haily Rowe called at [facility], transferred to Medical, report called. Discharge instructions, medications reviewed, ADL's, physical assessment, and vital signs discussed. 5